# Patient Record
Sex: FEMALE | Race: WHITE | NOT HISPANIC OR LATINO
[De-identification: names, ages, dates, MRNs, and addresses within clinical notes are randomized per-mention and may not be internally consistent; named-entity substitution may affect disease eponyms.]

---

## 2017-01-12 PROBLEM — M16.11 PRIMARY LOCALIZED OSTEOARTHROSIS OF PELVIC REGION, RIGHT: Status: ACTIVE | Noted: 2017-01-12

## 2017-01-23 ENCOUNTER — FORM ENCOUNTER (OUTPATIENT)
Age: 72
End: 2017-01-23

## 2017-01-24 ENCOUNTER — OTHER (OUTPATIENT)
Age: 72
End: 2017-01-24

## 2017-01-24 ENCOUNTER — OUTPATIENT (OUTPATIENT)
Dept: OUTPATIENT SERVICES | Facility: HOSPITAL | Age: 72
LOS: 1 days | End: 2017-01-24
Payer: MEDICARE

## 2017-01-24 VITALS
TEMPERATURE: 99 F | DIASTOLIC BLOOD PRESSURE: 64 MMHG | SYSTOLIC BLOOD PRESSURE: 136 MMHG | WEIGHT: 194.01 LBS | RESPIRATION RATE: 16 BRPM | HEART RATE: 80 BPM | HEIGHT: 62 IN

## 2017-01-24 DIAGNOSIS — M16.11 UNILATERAL PRIMARY OSTEOARTHRITIS, RIGHT HIP: ICD-10-CM

## 2017-01-24 DIAGNOSIS — E11.9 TYPE 2 DIABETES MELLITUS WITHOUT COMPLICATIONS: ICD-10-CM

## 2017-01-24 DIAGNOSIS — Z98.890 OTHER SPECIFIED POSTPROCEDURAL STATES: Chronic | ICD-10-CM

## 2017-01-24 DIAGNOSIS — M19.90 UNSPECIFIED OSTEOARTHRITIS, UNSPECIFIED SITE: ICD-10-CM

## 2017-01-24 DIAGNOSIS — Z96.651 PRESENCE OF RIGHT ARTIFICIAL KNEE JOINT: Chronic | ICD-10-CM

## 2017-01-24 DIAGNOSIS — Z90.49 ACQUIRED ABSENCE OF OTHER SPECIFIED PARTS OF DIGESTIVE TRACT: Chronic | ICD-10-CM

## 2017-01-24 DIAGNOSIS — G47.30 SLEEP APNEA, UNSPECIFIED: ICD-10-CM

## 2017-01-24 LAB
APPEARANCE UR: CLEAR — SIGNIFICANT CHANGE UP
APTT BLD: 31.1 SEC — SIGNIFICANT CHANGE UP (ref 27.5–37.4)
BACTERIA # UR AUTO: SIGNIFICANT CHANGE UP
BASOPHILS # BLD AUTO: 0.03 K/UL — SIGNIFICANT CHANGE UP (ref 0–0.2)
BASOPHILS NFR BLD AUTO: 0.3 % — SIGNIFICANT CHANGE UP (ref 0–2)
BILIRUB UR-MCNC: NEGATIVE — SIGNIFICANT CHANGE UP
BLD GP AB SCN SERPL QL: NEGATIVE — SIGNIFICANT CHANGE UP
BLOOD UR QL VISUAL: NEGATIVE — SIGNIFICANT CHANGE UP
BUN SERPL-MCNC: 31 MG/DL — HIGH (ref 7–23)
CALCIUM SERPL-MCNC: 9.8 MG/DL — SIGNIFICANT CHANGE UP (ref 8.4–10.5)
CHLORIDE SERPL-SCNC: 103 MMOL/L — SIGNIFICANT CHANGE UP (ref 98–107)
CO2 SERPL-SCNC: 22 MMOL/L — SIGNIFICANT CHANGE UP (ref 22–31)
COLOR SPEC: SIGNIFICANT CHANGE UP
CREAT SERPL-MCNC: 0.73 MG/DL — SIGNIFICANT CHANGE UP (ref 0.5–1.3)
EOSINOPHIL # BLD AUTO: 0.32 K/UL — SIGNIFICANT CHANGE UP (ref 0–0.5)
EOSINOPHIL NFR BLD AUTO: 3.2 % — SIGNIFICANT CHANGE UP (ref 0–6)
GLUCOSE SERPL-MCNC: 133 MG/DL — HIGH (ref 70–99)
GLUCOSE UR-MCNC: NEGATIVE — SIGNIFICANT CHANGE UP
HBA1C BLD-MCNC: 6.7 % — HIGH (ref 4–5.6)
HCT VFR BLD CALC: 36.7 % — SIGNIFICANT CHANGE UP (ref 34.5–45)
HCT VFR BLD CALC: 36.7 % — SIGNIFICANT CHANGE UP (ref 34.5–45)
HGB BLD-MCNC: 12 G/DL — SIGNIFICANT CHANGE UP (ref 11.5–15.5)
HGB BLD-MCNC: 12 G/DL — SIGNIFICANT CHANGE UP (ref 11.5–15.5)
IMM GRANULOCYTES NFR BLD AUTO: 0.4 % — SIGNIFICANT CHANGE UP (ref 0–1.5)
INR BLD: 0.82 — LOW (ref 0.87–1.18)
KETONES UR-MCNC: NEGATIVE — SIGNIFICANT CHANGE UP
LEUKOCYTE ESTERASE UR-ACNC: HIGH
LYMPHOCYTES # BLD AUTO: 2.2 K/UL — SIGNIFICANT CHANGE UP (ref 1–3.3)
LYMPHOCYTES # BLD AUTO: 21.8 % — SIGNIFICANT CHANGE UP (ref 13–44)
MCHC RBC-ENTMCNC: 29.6 PG — SIGNIFICANT CHANGE UP (ref 27–34)
MCHC RBC-ENTMCNC: 29.6 PG — SIGNIFICANT CHANGE UP (ref 27–34)
MCHC RBC-ENTMCNC: 32.7 % — SIGNIFICANT CHANGE UP (ref 32–36)
MCHC RBC-ENTMCNC: 32.7 % — SIGNIFICANT CHANGE UP (ref 32–36)
MCV RBC AUTO: 90.4 FL — SIGNIFICANT CHANGE UP (ref 80–100)
MCV RBC AUTO: 90.4 FL — SIGNIFICANT CHANGE UP (ref 80–100)
MONOCYTES # BLD AUTO: 0.83 K/UL — SIGNIFICANT CHANGE UP (ref 0–0.9)
MONOCYTES NFR BLD AUTO: 8.2 % — SIGNIFICANT CHANGE UP (ref 2–14)
MUCOUS THREADS # UR AUTO: SIGNIFICANT CHANGE UP
NEUTROPHILS # BLD AUTO: 6.68 K/UL — SIGNIFICANT CHANGE UP (ref 1.8–7.4)
NEUTROPHILS NFR BLD AUTO: 66.1 % — SIGNIFICANT CHANGE UP (ref 43–77)
NITRITE UR-MCNC: NEGATIVE — SIGNIFICANT CHANGE UP
NON-SQ EPI CELLS # UR AUTO: <1 — SIGNIFICANT CHANGE UP
PH UR: 5.5 — SIGNIFICANT CHANGE UP (ref 4.6–8)
PLATELET # BLD AUTO: 253 K/UL — SIGNIFICANT CHANGE UP (ref 150–400)
PLATELET # BLD AUTO: 253 K/UL — SIGNIFICANT CHANGE UP (ref 150–400)
PMV BLD: 9.6 FL — SIGNIFICANT CHANGE UP (ref 7–13)
PMV BLD: 9.6 FL — SIGNIFICANT CHANGE UP (ref 7–13)
POTASSIUM SERPL-MCNC: 5 MMOL/L — SIGNIFICANT CHANGE UP (ref 3.5–5.3)
POTASSIUM SERPL-SCNC: 5 MMOL/L — SIGNIFICANT CHANGE UP (ref 3.5–5.3)
PROT UR-MCNC: 100 — HIGH
PROTHROM AB SERPL-ACNC: 9.3 SEC — LOW (ref 10–13.1)
RBC # BLD: 4.06 M/UL — SIGNIFICANT CHANGE UP (ref 3.8–5.2)
RBC # BLD: 4.06 M/UL — SIGNIFICANT CHANGE UP (ref 3.8–5.2)
RBC # FLD: 15 % — HIGH (ref 10.3–14.5)
RBC # FLD: 15 % — HIGH (ref 10.3–14.5)
RBC CASTS # UR COMP ASSIST: SIGNIFICANT CHANGE UP (ref 0–?)
RH IG SCN BLD-IMP: POSITIVE — SIGNIFICANT CHANGE UP
SODIUM SERPL-SCNC: 142 MMOL/L — SIGNIFICANT CHANGE UP (ref 135–145)
SP GR SPEC: 1.02 — SIGNIFICANT CHANGE UP (ref 1–1.03)
SQUAMOUS # UR AUTO: SIGNIFICANT CHANGE UP
UROBILINOGEN FLD QL: NORMAL E.U. — SIGNIFICANT CHANGE UP (ref 0.1–0.2)
WBC # BLD: 10.1 K/UL — SIGNIFICANT CHANGE UP (ref 3.8–10.5)
WBC # BLD: 10.1 K/UL — SIGNIFICANT CHANGE UP (ref 3.8–10.5)
WBC # FLD AUTO: 10.1 K/UL — SIGNIFICANT CHANGE UP (ref 3.8–10.5)
WBC # FLD AUTO: 10.1 K/UL — SIGNIFICANT CHANGE UP (ref 3.8–10.5)
WBC UR QL: HIGH (ref 0–?)

## 2017-01-24 PROCEDURE — 93010 ELECTROCARDIOGRAM REPORT: CPT

## 2017-01-24 PROCEDURE — 71020: CPT | Mod: 26

## 2017-01-24 RX ORDER — SODIUM CHLORIDE 9 MG/ML
3 INJECTION INTRAMUSCULAR; INTRAVENOUS; SUBCUTANEOUS EVERY 8 HOURS
Qty: 0 | Refills: 0 | Status: DISCONTINUED | OUTPATIENT
Start: 2017-03-06 | End: 2017-03-08

## 2017-01-24 RX ORDER — SODIUM CHLORIDE 9 MG/ML
1000 INJECTION, SOLUTION INTRAVENOUS
Qty: 0 | Refills: 0 | Status: DISCONTINUED | OUTPATIENT
Start: 2017-03-06 | End: 2017-03-06

## 2017-01-24 RX ORDER — MUPIROCIN CALCIUM 20 MG/G
2 OINTMENT TOPICAL
Qty: 32 | Refills: 0 | Status: ACTIVE | COMMUNITY
Start: 2017-01-24 | End: 1900-01-01

## 2017-01-24 RX ORDER — PANTOPRAZOLE SODIUM 20 MG/1
40 TABLET, DELAYED RELEASE ORAL ONCE
Qty: 0 | Refills: 0 | Status: DISCONTINUED | OUTPATIENT
Start: 2017-03-06 | End: 2017-03-06

## 2017-01-24 RX ORDER — ACETAMINOPHEN 500 MG
975 TABLET ORAL ONCE
Qty: 0 | Refills: 0 | Status: DISCONTINUED | OUTPATIENT
Start: 2017-03-06 | End: 2017-03-06

## 2017-01-24 RX ORDER — TRAMADOL HYDROCHLORIDE 50 MG/1
50 TABLET ORAL ONCE
Qty: 0 | Refills: 0 | Status: DISCONTINUED | OUTPATIENT
Start: 2017-03-06 | End: 2017-03-06

## 2017-01-24 NOTE — H&P PST ADULT - PMH
Colon polyp  benign 2003  Depression    Diabetes mellitus  type 2  Elevated uric acid in blood    HTN (hypertension)    Hyperlipidemia    Hypothyroidism    Knee pain  right knee pain and deformity  since age 21  Obesity (BMI 30-39.9)    Shoulder fracture, right  2006  Sleep apnea  uses CPAP machine ; had sleep studies in 2003 and 2011 Colon polyp  benign 2003  Depression    Diabetes mellitus  type 2  Elevated uric acid in blood    HTN (hypertension)    Hyperlipidemia    Hypothyroidism    Knee pain  right knee pain and deformity  since age 21  Obesity (BMI 30-39.9)    Osteoarthritis    Shoulder fracture, right  2006  Sleep apnea  uses CPAP machine ; had sleep studies in 2003 and 2011

## 2017-01-24 NOTE — H&P PST ADULT - PROBLEM SELECTOR PLAN 1
scheduled for  right total hip replacement direct anterior approach on 2/6/17.   labs pending   EKG in chart.   preop instructions provided to pt

## 2017-01-24 NOTE — H&P PST ADULT - PROBLEM SELECTOR PLAN 2
a1c done and will order FS for the day of surgery.  pt advised to stop metformin after the am dose on 2/5/17.

## 2017-01-24 NOTE — H&P PST ADULT - NEGATIVE ENMT SYMPTOMS
no hearing difficulty/no gum bleeding/no throat pain/no nose bleeds/no recurrent cold sores/no post-nasal discharge/no dysphagia/no abnormal taste sensation/no dry mouth/no sinus symptoms/no ear pain/no nasal obstruction/no nasal discharge/no nasal congestion

## 2017-01-24 NOTE — H&P PST ADULT - PSH
H/O knee surgery  right knee x 3 prior to  right knee replacement - 1966 - 1995  H/O shoulder surgery  right - 2006  H/O total knee replacement, right  2008  History of colon resection  for benign polyp - 2003

## 2017-01-24 NOTE — H&P PST ADULT - HISTORY OF PRESENT ILLNESS
71 yrs old female with h/o Osteoarthritis and with c/o right hip pain for many yrs presents for preop eval to have 71 yrs old female with h/o Osteoarthritis and with c/o right hip pain for many yrs presents for preop eval to have right total hip replacement direct anterior approach on 2/6/17. pt stated that she has been having pain for many yrs , uses cane to walk and and X rays and MRI and replacement was recommended.

## 2017-01-24 NOTE — H&P PST ADULT - NEGATIVE GASTROINTESTINAL SYMPTOMS
no melena/no abdominal pain/no constipation/no hematochezia/no flatulence/no change in bowel habits/no nausea

## 2017-01-24 NOTE — H&P PST ADULT - ASSESSMENT
71 yrs old female with h/o Osteoarthritis and with c/o right hip pain for many yrs presents for preop eval to have right total hip replacement direct anterior approach on 2/6/17.

## 2017-01-25 LAB
GRAM STN SPT: SIGNIFICANT CHANGE UP
SPECIMEN SOURCE: SIGNIFICANT CHANGE UP
SPECIMEN SOURCE: SIGNIFICANT CHANGE UP

## 2017-01-26 ENCOUNTER — RX RENEWAL (OUTPATIENT)
Age: 72
End: 2017-01-26

## 2017-01-26 ENCOUNTER — OTHER (OUTPATIENT)
Age: 72
End: 2017-01-26

## 2017-01-26 LAB
-  AMIKACIN: SIGNIFICANT CHANGE UP
-  AMPICILLIN/SULBACTAM: SIGNIFICANT CHANGE UP
-  AMPICILLIN: SIGNIFICANT CHANGE UP
-  AZTREONAM: SIGNIFICANT CHANGE UP
-  CEFAZOLIN: SIGNIFICANT CHANGE UP
-  CEFEPIME: SIGNIFICANT CHANGE UP
-  CEFOXITIN: SIGNIFICANT CHANGE UP
-  CEFTAZIDIME: SIGNIFICANT CHANGE UP
-  CEFTRIAXONE: SIGNIFICANT CHANGE UP
-  CIPROFLOXACIN: SIGNIFICANT CHANGE UP
-  ERTAPENEM: SIGNIFICANT CHANGE UP
-  GENTAMICIN: SIGNIFICANT CHANGE UP
-  IMIPENEM: SIGNIFICANT CHANGE UP
-  LEVOFLOXACIN: SIGNIFICANT CHANGE UP
-  MEROPENEM: SIGNIFICANT CHANGE UP
-  NITROFURANTOIN: SIGNIFICANT CHANGE UP
-  PIPERACILLIN/TAZOBACTAM: SIGNIFICANT CHANGE UP
-  TIGECYCLINE: SIGNIFICANT CHANGE UP
-  TOBRAMYCIN: SIGNIFICANT CHANGE UP
-  TRIMETHOPRIM/SULFAMETHOXAZOLE: SIGNIFICANT CHANGE UP
BACTERIA SPT RESP CULT: SIGNIFICANT CHANGE UP
BACTERIA UR CULT: SIGNIFICANT CHANGE UP
METHOD TYPE: SIGNIFICANT CHANGE UP
ORGANISM # SPEC MICROSCOPIC CNT: SIGNIFICANT CHANGE UP
ORGANISM # SPEC MICROSCOPIC CNT: SIGNIFICANT CHANGE UP

## 2017-01-26 RX ORDER — MUPIROCIN 20 MG/G
2 OINTMENT TOPICAL
Qty: 22 | Refills: 0 | Status: ACTIVE | COMMUNITY
Start: 2017-01-26 | End: 1900-01-01

## 2017-01-26 RX ORDER — MUPIROCIN 20 MG/G
2 OINTMENT TOPICAL 3 TIMES DAILY
Qty: 32 | Refills: 0 | Status: ACTIVE | COMMUNITY
Start: 2017-01-26 | End: 1900-01-01

## 2017-01-27 ENCOUNTER — OTHER (OUTPATIENT)
Age: 72
End: 2017-01-27

## 2017-01-27 RX ORDER — CIPROFLOXACIN HYDROCHLORIDE 500 MG/1
500 TABLET, FILM COATED ORAL TWICE DAILY
Qty: 10 | Refills: 0 | Status: ACTIVE | COMMUNITY
Start: 2017-01-27 | End: 1900-01-01

## 2017-02-06 ENCOUNTER — APPOINTMENT (OUTPATIENT)
Dept: ORTHOPEDIC SURGERY | Facility: HOSPITAL | Age: 72
End: 2017-02-06

## 2017-02-14 PROBLEM — E66.9 OBESITY, UNSPECIFIED: Chronic | Status: ACTIVE | Noted: 2017-01-24

## 2017-02-14 PROBLEM — E11.9 TYPE 2 DIABETES MELLITUS WITHOUT COMPLICATIONS: Chronic | Status: ACTIVE | Noted: 2017-01-24

## 2017-02-14 PROBLEM — S42.91XA FRACTURE OF RIGHT SHOULDER GIRDLE, PART UNSPECIFIED, INITIAL ENCOUNTER FOR CLOSED FRACTURE: Chronic | Status: ACTIVE | Noted: 2017-01-24

## 2017-02-14 PROBLEM — E78.5 HYPERLIPIDEMIA, UNSPECIFIED: Chronic | Status: ACTIVE | Noted: 2017-01-24

## 2017-02-14 PROBLEM — M19.90 UNSPECIFIED OSTEOARTHRITIS, UNSPECIFIED SITE: Chronic | Status: ACTIVE | Noted: 2017-01-24

## 2017-02-14 PROBLEM — E79.0 HYPERURICEMIA WITHOUT SIGNS OF INFLAMMATORY ARTHRITIS AND TOPHACEOUS DISEASE: Chronic | Status: ACTIVE | Noted: 2017-01-24

## 2017-02-14 PROBLEM — E03.9 HYPOTHYROIDISM, UNSPECIFIED: Chronic | Status: ACTIVE | Noted: 2017-01-24

## 2017-02-14 PROBLEM — M25.569 PAIN IN UNSPECIFIED KNEE: Chronic | Status: ACTIVE | Noted: 2017-01-24

## 2017-02-14 PROBLEM — G47.30 SLEEP APNEA, UNSPECIFIED: Chronic | Status: ACTIVE | Noted: 2017-01-24

## 2017-02-14 PROBLEM — K63.5 POLYP OF COLON: Chronic | Status: ACTIVE | Noted: 2017-01-24

## 2017-02-14 PROBLEM — F32.9 MAJOR DEPRESSIVE DISORDER, SINGLE EPISODE, UNSPECIFIED: Chronic | Status: ACTIVE | Noted: 2017-01-24

## 2017-02-14 PROBLEM — I10 ESSENTIAL (PRIMARY) HYPERTENSION: Chronic | Status: ACTIVE | Noted: 2017-01-24

## 2017-02-21 ENCOUNTER — OUTPATIENT (OUTPATIENT)
Dept: OUTPATIENT SERVICES | Facility: HOSPITAL | Age: 72
LOS: 1 days | End: 2017-02-21
Payer: MEDICARE

## 2017-02-21 VITALS
WEIGHT: 190.04 LBS | DIASTOLIC BLOOD PRESSURE: 66 MMHG | HEART RATE: 85 BPM | TEMPERATURE: 98 F | RESPIRATION RATE: 16 BRPM | SYSTOLIC BLOOD PRESSURE: 120 MMHG | HEIGHT: 62 IN

## 2017-02-21 DIAGNOSIS — Z98.890 OTHER SPECIFIED POSTPROCEDURAL STATES: Chronic | ICD-10-CM

## 2017-02-21 DIAGNOSIS — Z96.651 PRESENCE OF RIGHT ARTIFICIAL KNEE JOINT: Chronic | ICD-10-CM

## 2017-02-21 DIAGNOSIS — Z90.49 ACQUIRED ABSENCE OF OTHER SPECIFIED PARTS OF DIGESTIVE TRACT: Chronic | ICD-10-CM

## 2017-02-21 DIAGNOSIS — M16.11 UNILATERAL PRIMARY OSTEOARTHRITIS, RIGHT HIP: ICD-10-CM

## 2017-02-21 LAB
ALBUMIN SERPL ELPH-MCNC: 4.3 G/DL — SIGNIFICANT CHANGE UP (ref 3.3–5)
ALP SERPL-CCNC: 117 U/L — SIGNIFICANT CHANGE UP (ref 40–120)
ALT FLD-CCNC: 13 U/L — SIGNIFICANT CHANGE UP (ref 4–33)
APPEARANCE UR: CLEAR — SIGNIFICANT CHANGE UP
APTT BLD: 31.4 SEC — SIGNIFICANT CHANGE UP (ref 27.5–37.4)
AST SERPL-CCNC: 17 U/L — SIGNIFICANT CHANGE UP (ref 4–32)
BASOPHILS # BLD AUTO: 0.02 K/UL — SIGNIFICANT CHANGE UP (ref 0–0.2)
BASOPHILS NFR BLD AUTO: 0.2 % — SIGNIFICANT CHANGE UP (ref 0–2)
BILIRUB SERPL-MCNC: 0.2 MG/DL — SIGNIFICANT CHANGE UP (ref 0.2–1.2)
BILIRUB UR-MCNC: NEGATIVE — SIGNIFICANT CHANGE UP
BLD GP AB SCN SERPL QL: NEGATIVE — SIGNIFICANT CHANGE UP
BLOOD UR QL VISUAL: NEGATIVE — SIGNIFICANT CHANGE UP
BUN SERPL-MCNC: 28 MG/DL — HIGH (ref 7–23)
BUN SERPL-MCNC: 28 MG/DL — HIGH (ref 7–23)
CALCIUM SERPL-MCNC: 10.1 MG/DL — SIGNIFICANT CHANGE UP (ref 8.4–10.5)
CALCIUM SERPL-MCNC: 10.1 MG/DL — SIGNIFICANT CHANGE UP (ref 8.4–10.5)
CHLORIDE SERPL-SCNC: 102 MMOL/L — SIGNIFICANT CHANGE UP (ref 98–107)
CHLORIDE SERPL-SCNC: 102 MMOL/L — SIGNIFICANT CHANGE UP (ref 98–107)
CO2 SERPL-SCNC: 23 MMOL/L — SIGNIFICANT CHANGE UP (ref 22–31)
CO2 SERPL-SCNC: 23 MMOL/L — SIGNIFICANT CHANGE UP (ref 22–31)
COLOR SPEC: SIGNIFICANT CHANGE UP
CREAT SERPL-MCNC: 0.85 MG/DL — SIGNIFICANT CHANGE UP (ref 0.5–1.3)
CREAT SERPL-MCNC: 0.85 MG/DL — SIGNIFICANT CHANGE UP (ref 0.5–1.3)
EOSINOPHIL # BLD AUTO: 0.26 K/UL — SIGNIFICANT CHANGE UP (ref 0–0.5)
EOSINOPHIL NFR BLD AUTO: 2.8 % — SIGNIFICANT CHANGE UP (ref 0–6)
GLUCOSE SERPL-MCNC: 186 MG/DL — HIGH (ref 70–99)
GLUCOSE SERPL-MCNC: 186 MG/DL — HIGH (ref 70–99)
GLUCOSE UR-MCNC: NEGATIVE — SIGNIFICANT CHANGE UP
HBA1C BLD-MCNC: 6.5 % — HIGH (ref 4–5.6)
HCT VFR BLD CALC: 35 % — SIGNIFICANT CHANGE UP (ref 34.5–45)
HGB BLD-MCNC: 11.1 G/DL — LOW (ref 11.5–15.5)
IMM GRANULOCYTES NFR BLD AUTO: 0.4 % — SIGNIFICANT CHANGE UP (ref 0–1.5)
INR BLD: 0.89 — SIGNIFICANT CHANGE UP (ref 0.87–1.18)
KETONES UR-MCNC: NEGATIVE — SIGNIFICANT CHANGE UP
LEUKOCYTE ESTERASE UR-ACNC: NEGATIVE — SIGNIFICANT CHANGE UP
LYMPHOCYTES # BLD AUTO: 2.11 K/UL — SIGNIFICANT CHANGE UP (ref 1–3.3)
LYMPHOCYTES # BLD AUTO: 22.5 % — SIGNIFICANT CHANGE UP (ref 13–44)
MCHC RBC-ENTMCNC: 28.6 PG — SIGNIFICANT CHANGE UP (ref 27–34)
MCHC RBC-ENTMCNC: 31.7 % — LOW (ref 32–36)
MCV RBC AUTO: 90.2 FL — SIGNIFICANT CHANGE UP (ref 80–100)
MONOCYTES # BLD AUTO: 0.7 K/UL — SIGNIFICANT CHANGE UP (ref 0–0.9)
MONOCYTES NFR BLD AUTO: 7.5 % — SIGNIFICANT CHANGE UP (ref 2–14)
MUCOUS THREADS # UR AUTO: SIGNIFICANT CHANGE UP
NEUTROPHILS # BLD AUTO: 6.24 K/UL — SIGNIFICANT CHANGE UP (ref 1.8–7.4)
NEUTROPHILS NFR BLD AUTO: 66.6 % — SIGNIFICANT CHANGE UP (ref 43–77)
NITRITE UR-MCNC: NEGATIVE — SIGNIFICANT CHANGE UP
PH UR: 6 — SIGNIFICANT CHANGE UP (ref 4.6–8)
PLATELET # BLD AUTO: 269 K/UL — SIGNIFICANT CHANGE UP (ref 150–400)
PMV BLD: 9.4 FL — SIGNIFICANT CHANGE UP (ref 7–13)
POTASSIUM SERPL-MCNC: 4.1 MMOL/L — SIGNIFICANT CHANGE UP (ref 3.5–5.3)
POTASSIUM SERPL-MCNC: 4.1 MMOL/L — SIGNIFICANT CHANGE UP (ref 3.5–5.3)
POTASSIUM SERPL-SCNC: 4.1 MMOL/L — SIGNIFICANT CHANGE UP (ref 3.5–5.3)
POTASSIUM SERPL-SCNC: 4.1 MMOL/L — SIGNIFICANT CHANGE UP (ref 3.5–5.3)
PROT SERPL-MCNC: 7.2 G/DL — SIGNIFICANT CHANGE UP (ref 6–8.3)
PROT UR-MCNC: 100 — HIGH
PROTHROM AB SERPL-ACNC: 10.1 SEC — SIGNIFICANT CHANGE UP (ref 10–13.1)
RBC # BLD: 3.88 M/UL — SIGNIFICANT CHANGE UP (ref 3.8–5.2)
RBC # FLD: 15 % — HIGH (ref 10.3–14.5)
RBC CASTS # UR COMP ASSIST: SIGNIFICANT CHANGE UP (ref 0–?)
RH IG SCN BLD-IMP: POSITIVE — SIGNIFICANT CHANGE UP
SODIUM SERPL-SCNC: 145 MMOL/L — SIGNIFICANT CHANGE UP (ref 135–145)
SODIUM SERPL-SCNC: 145 MMOL/L — SIGNIFICANT CHANGE UP (ref 135–145)
SP GR SPEC: 1.02 — SIGNIFICANT CHANGE UP (ref 1–1.03)
SQUAMOUS # UR AUTO: SIGNIFICANT CHANGE UP
TSH SERPL-MCNC: 2.75 UIU/ML — SIGNIFICANT CHANGE UP (ref 0.27–4.2)
UROBILINOGEN FLD QL: NORMAL E.U. — SIGNIFICANT CHANGE UP (ref 0.1–0.2)
WBC # BLD: 9.37 K/UL — SIGNIFICANT CHANGE UP (ref 3.8–10.5)
WBC # FLD AUTO: 9.37 K/UL — SIGNIFICANT CHANGE UP (ref 3.8–10.5)
WBC UR QL: SIGNIFICANT CHANGE UP (ref 0–?)

## 2017-02-21 PROCEDURE — 93010 ELECTROCARDIOGRAM REPORT: CPT

## 2017-02-21 RX ORDER — PANTOPRAZOLE SODIUM 20 MG/1
40 TABLET, DELAYED RELEASE ORAL ONCE
Qty: 0 | Refills: 0 | Status: COMPLETED | OUTPATIENT
Start: 2017-03-06 | End: 2017-03-06

## 2017-02-21 RX ORDER — ACETAMINOPHEN 500 MG
975 TABLET ORAL ONCE
Qty: 0 | Refills: 0 | Status: COMPLETED | OUTPATIENT
Start: 2017-03-06 | End: 2017-03-06

## 2017-02-21 RX ORDER — GABAPENTIN 400 MG/1
100 CAPSULE ORAL ONCE
Qty: 0 | Refills: 0 | Status: COMPLETED | OUTPATIENT
Start: 2017-03-06 | End: 2017-03-06

## 2017-02-21 RX ORDER — SODIUM CHLORIDE 9 MG/ML
1000 INJECTION, SOLUTION INTRAVENOUS
Qty: 0 | Refills: 0 | Status: DISCONTINUED | OUTPATIENT
Start: 2017-03-06 | End: 2017-03-06

## 2017-02-21 RX ORDER — TRAMADOL HYDROCHLORIDE 50 MG/1
50 TABLET ORAL ONCE
Qty: 0 | Refills: 0 | Status: DISCONTINUED | OUTPATIENT
Start: 2017-03-06 | End: 2017-03-06

## 2017-02-21 RX ORDER — OMEGA-3 ACID ETHYL ESTERS 1 G
1 CAPSULE ORAL
Qty: 0 | Refills: 0 | COMMUNITY

## 2017-02-21 NOTE — H&P PST ADULT - HISTORY OF PRESENT ILLNESS
71 yrs old female with h/o Osteoarthritis and with c/o right hip pain for many yrs presents for preop eval to have right total hip replacement direct anterior approach on 3/6/17. Surgery was previously scheduled , then cancelled as pt had a cold which has not resolved. Surgery now rescheduled.

## 2017-02-21 NOTE — H&P PST ADULT - PROBLEM SELECTOR PLAN 2
Pt advised not to take Metformin night prior to surgery, or morning of surgery.  Accucheck on admission

## 2017-02-21 NOTE — H&P PST ADULT - PROBLEM SELECTOR PLAN 1
right total hip replacement direct anterior approach on 3/6/17.    CBC CMP PT/PTT T&S nsoe culture HgbA1C UA CS TSH    Pt reports she was seen by Dr. Britt for clearance, requested on chart.  EKG done at [facility] sent to Dr. Britt    Pt reports she has appt to see PMD 2/27/17 for clearance

## 2017-02-21 NOTE — H&P PST ADULT - ENDOCRINE COMMENTS
h/o DM type 2  Pt does not often check FSBS.  Hypothyroidism, levothyroxine recently increased from 150 mcg to 175 mcg

## 2017-02-21 NOTE — H&P PST ADULT - NEUROLOGICAL DETAILS
alert and oriented x 3/responds to verbal commands/responds to pain alert and oriented x 3/cranial nerves intact/sensation intact/responds to pain/responds to verbal commands

## 2017-02-21 NOTE — H&P PST ADULT - NEGATIVE GASTROINTESTINAL SYMPTOMS
no nausea/no hematochezia/no abdominal pain/no diarrhea/no melena/no constipation/no change in bowel habits/no flatulence/no vomiting

## 2017-02-21 NOTE — H&P PST ADULT - PMH
Colon polyp  benign 2003  Depression    Diabetes mellitus  type 2  Elevated uric acid in blood    HTN (hypertension)    Hyperlipidemia    Hypothyroidism    Knee pain  right knee pain and deformity  since age 21  Obesity (BMI 30-39.9)    Osteoarthritis    Shoulder fracture, right  2006  Sleep apnea  uses CPAP machine ; had sleep studies in 2003 and 2011

## 2017-02-21 NOTE — H&P PST ADULT - BLOOD TRANSFUSION, PREVIOUS, PROFILE
Patient came in for her weekly allergy injection. Injection given as charted.  Patient tolerated injection well with no reaction noted or verbalized.  
no

## 2017-02-22 LAB
SPECIMEN SOURCE: SIGNIFICANT CHANGE UP
SPECIMEN SOURCE: SIGNIFICANT CHANGE UP

## 2017-02-23 ENCOUNTER — APPOINTMENT (OUTPATIENT)
Dept: ORTHOPEDIC SURGERY | Facility: CLINIC | Age: 72
End: 2017-02-23

## 2017-02-23 LAB
BACTERIA NPH CULT: SIGNIFICANT CHANGE UP
BACTERIA UR CULT: SIGNIFICANT CHANGE UP

## 2017-03-05 ENCOUNTER — RESULT REVIEW (OUTPATIENT)
Age: 72
End: 2017-03-05

## 2017-03-06 ENCOUNTER — APPOINTMENT (OUTPATIENT)
Dept: ORTHOPEDIC SURGERY | Facility: HOSPITAL | Age: 72
End: 2017-03-06

## 2017-03-06 ENCOUNTER — INPATIENT (INPATIENT)
Facility: HOSPITAL | Age: 72
LOS: 1 days | Discharge: HOME | End: 2017-03-08
Attending: ORTHOPAEDIC SURGERY | Admitting: ORTHOPAEDIC SURGERY
Payer: MEDICARE

## 2017-03-06 VITALS
DIASTOLIC BLOOD PRESSURE: 56 MMHG | WEIGHT: 190.04 LBS | HEART RATE: 81 BPM | SYSTOLIC BLOOD PRESSURE: 135 MMHG | TEMPERATURE: 98 F | OXYGEN SATURATION: 95 % | RESPIRATION RATE: 18 BRPM | HEIGHT: 62 IN

## 2017-03-06 DIAGNOSIS — Z98.890 OTHER SPECIFIED POSTPROCEDURAL STATES: Chronic | ICD-10-CM

## 2017-03-06 DIAGNOSIS — Z90.49 ACQUIRED ABSENCE OF OTHER SPECIFIED PARTS OF DIGESTIVE TRACT: Chronic | ICD-10-CM

## 2017-03-06 DIAGNOSIS — M16.11 UNILATERAL PRIMARY OSTEOARTHRITIS, RIGHT HIP: ICD-10-CM

## 2017-03-06 DIAGNOSIS — Z96.651 PRESENCE OF RIGHT ARTIFICIAL KNEE JOINT: Chronic | ICD-10-CM

## 2017-03-06 LAB
BUN SERPL-MCNC: 37 MG/DL — HIGH (ref 7–23)
CALCIUM SERPL-MCNC: 8.8 MG/DL — SIGNIFICANT CHANGE UP (ref 8.4–10.5)
CHLORIDE SERPL-SCNC: 104 MMOL/L — SIGNIFICANT CHANGE UP (ref 98–107)
CO2 SERPL-SCNC: 21 MMOL/L — LOW (ref 22–31)
CREAT SERPL-MCNC: 0.83 MG/DL — SIGNIFICANT CHANGE UP (ref 0.5–1.3)
GLUCOSE SERPL-MCNC: 227 MG/DL — HIGH (ref 70–99)
HCT VFR BLD CALC: 29.3 % — LOW (ref 34.5–45)
HGB BLD-MCNC: 9.3 G/DL — LOW (ref 11.5–15.5)
MCHC RBC-ENTMCNC: 28.4 PG — SIGNIFICANT CHANGE UP (ref 27–34)
MCHC RBC-ENTMCNC: 31.7 % — LOW (ref 32–36)
MCV RBC AUTO: 89.3 FL — SIGNIFICANT CHANGE UP (ref 80–100)
PLATELET # BLD AUTO: 205 K/UL — SIGNIFICANT CHANGE UP (ref 150–400)
PMV BLD: 9.1 FL — SIGNIFICANT CHANGE UP (ref 7–13)
POTASSIUM SERPL-MCNC: 4.7 MMOL/L — SIGNIFICANT CHANGE UP (ref 3.5–5.3)
POTASSIUM SERPL-SCNC: 4.7 MMOL/L — SIGNIFICANT CHANGE UP (ref 3.5–5.3)
RBC # BLD: 3.28 M/UL — LOW (ref 3.8–5.2)
RBC # FLD: 15.2 % — HIGH (ref 10.3–14.5)
SODIUM SERPL-SCNC: 141 MMOL/L — SIGNIFICANT CHANGE UP (ref 135–145)
WBC # BLD: 11.71 K/UL — HIGH (ref 3.8–10.5)
WBC # FLD AUTO: 11.71 K/UL — HIGH (ref 3.8–10.5)

## 2017-03-06 PROCEDURE — 73501 X-RAY EXAM HIP UNI 1 VIEW: CPT | Mod: 26,LT

## 2017-03-06 PROCEDURE — 27130 TOTAL HIP ARTHROPLASTY: CPT | Mod: RT

## 2017-03-06 PROCEDURE — 88311 DECALCIFY TISSUE: CPT | Mod: 26

## 2017-03-06 PROCEDURE — 88305 TISSUE EXAM BY PATHOLOGIST: CPT | Mod: 26

## 2017-03-06 RX ORDER — DEXTROSE 50 % IN WATER 50 %
1 SYRINGE (ML) INTRAVENOUS ONCE
Qty: 0 | Refills: 0 | Status: DISCONTINUED | OUTPATIENT
Start: 2017-03-06 | End: 2017-03-08

## 2017-03-06 RX ORDER — SODIUM CHLORIDE 9 MG/ML
1000 INJECTION, SOLUTION INTRAVENOUS
Qty: 0 | Refills: 0 | Status: DISCONTINUED | OUTPATIENT
Start: 2017-03-06 | End: 2017-03-08

## 2017-03-06 RX ORDER — ASPIRIN/CALCIUM CARB/MAGNESIUM 324 MG
325 TABLET ORAL
Qty: 0 | Refills: 0 | Status: DISCONTINUED | OUTPATIENT
Start: 2017-03-06 | End: 2017-03-08

## 2017-03-06 RX ORDER — OXYCODONE HYDROCHLORIDE 5 MG/1
10 TABLET ORAL EVERY 4 HOURS
Qty: 0 | Refills: 0 | Status: DISCONTINUED | OUTPATIENT
Start: 2017-03-06 | End: 2017-03-07

## 2017-03-06 RX ORDER — ONDANSETRON 8 MG/1
4 TABLET, FILM COATED ORAL EVERY 6 HOURS
Qty: 0 | Refills: 0 | Status: DISCONTINUED | OUTPATIENT
Start: 2017-03-06 | End: 2017-03-06

## 2017-03-06 RX ORDER — TRAMADOL HYDROCHLORIDE 50 MG/1
50 TABLET ORAL EVERY 8 HOURS
Qty: 0 | Refills: 0 | Status: DISCONTINUED | OUTPATIENT
Start: 2017-03-06 | End: 2017-03-08

## 2017-03-06 RX ORDER — ATORVASTATIN CALCIUM 80 MG/1
1 TABLET, FILM COATED ORAL
Qty: 0 | Refills: 0 | COMMUNITY

## 2017-03-06 RX ORDER — LEVOTHYROXINE SODIUM 125 MCG
175 TABLET ORAL DAILY
Qty: 0 | Refills: 0 | Status: DISCONTINUED | OUTPATIENT
Start: 2017-03-06 | End: 2017-03-08

## 2017-03-06 RX ORDER — MORPHINE SULFATE 50 MG/1
4 CAPSULE, EXTENDED RELEASE ORAL EVERY 4 HOURS
Qty: 0 | Refills: 0 | Status: DISCONTINUED | OUTPATIENT
Start: 2017-03-06 | End: 2017-03-08

## 2017-03-06 RX ORDER — CELECOXIB 200 MG/1
200 CAPSULE ORAL
Qty: 0 | Refills: 0 | Status: DISCONTINUED | OUTPATIENT
Start: 2017-03-08 | End: 2017-03-08

## 2017-03-06 RX ORDER — VERAPAMIL HCL 240 MG
1 CAPSULE, EXTENDED RELEASE PELLETS 24 HR ORAL
Qty: 0 | Refills: 0 | COMMUNITY

## 2017-03-06 RX ORDER — ALLOPURINOL 300 MG
1 TABLET ORAL
Qty: 0 | Refills: 0 | COMMUNITY

## 2017-03-06 RX ORDER — SENNA PLUS 8.6 MG/1
2 TABLET ORAL AT BEDTIME
Qty: 0 | Refills: 0 | Status: DISCONTINUED | OUTPATIENT
Start: 2017-03-06 | End: 2017-03-08

## 2017-03-06 RX ORDER — HYDROMORPHONE HYDROCHLORIDE 2 MG/ML
0.5 INJECTION INTRAMUSCULAR; INTRAVENOUS; SUBCUTANEOUS
Qty: 0 | Refills: 0 | Status: DISCONTINUED | OUTPATIENT
Start: 2017-03-06 | End: 2017-03-06

## 2017-03-06 RX ORDER — LISINOPRIL 2.5 MG/1
20 TABLET ORAL DAILY
Qty: 0 | Refills: 0 | Status: DISCONTINUED | OUTPATIENT
Start: 2017-03-06 | End: 2017-03-08

## 2017-03-06 RX ORDER — SERTRALINE 25 MG/1
100 TABLET, FILM COATED ORAL DAILY
Qty: 0 | Refills: 0 | Status: DISCONTINUED | OUTPATIENT
Start: 2017-03-06 | End: 2017-03-08

## 2017-03-06 RX ORDER — PANTOPRAZOLE SODIUM 20 MG/1
40 TABLET, DELAYED RELEASE ORAL DAILY
Qty: 0 | Refills: 0 | Status: DISCONTINUED | OUTPATIENT
Start: 2017-03-06 | End: 2017-03-08

## 2017-03-06 RX ORDER — KETOROLAC TROMETHAMINE 30 MG/ML
15 SYRINGE (ML) INJECTION EVERY 8 HOURS
Qty: 0 | Refills: 0 | Status: DISCONTINUED | OUTPATIENT
Start: 2017-03-06 | End: 2017-03-07

## 2017-03-06 RX ORDER — ACETAMINOPHEN 500 MG
1000 TABLET ORAL ONCE
Qty: 0 | Refills: 0 | Status: DISCONTINUED | OUTPATIENT
Start: 2017-03-06 | End: 2017-03-06

## 2017-03-06 RX ORDER — INSULIN LISPRO 100/ML
VIAL (ML) SUBCUTANEOUS
Qty: 0 | Refills: 0 | Status: DISCONTINUED | OUTPATIENT
Start: 2017-03-06 | End: 2017-03-08

## 2017-03-06 RX ORDER — DEXTROSE 50 % IN WATER 50 %
12.5 SYRINGE (ML) INTRAVENOUS ONCE
Qty: 0 | Refills: 0 | Status: DISCONTINUED | OUTPATIENT
Start: 2017-03-06 | End: 2017-03-08

## 2017-03-06 RX ORDER — METFORMIN HYDROCHLORIDE 850 MG/1
0 TABLET ORAL
Qty: 0 | Refills: 0 | COMMUNITY

## 2017-03-06 RX ORDER — SODIUM CHLORIDE 9 MG/ML
1000 INJECTION INTRAMUSCULAR; INTRAVENOUS; SUBCUTANEOUS ONCE
Qty: 0 | Refills: 0 | Status: COMPLETED | OUTPATIENT
Start: 2017-03-06 | End: 2017-03-06

## 2017-03-06 RX ORDER — DOCUSATE SODIUM 100 MG
100 CAPSULE ORAL THREE TIMES A DAY
Qty: 0 | Refills: 0 | Status: DISCONTINUED | OUTPATIENT
Start: 2017-03-06 | End: 2017-03-08

## 2017-03-06 RX ORDER — GLUCAGON INJECTION, SOLUTION 0.5 MG/.1ML
1 INJECTION, SOLUTION SUBCUTANEOUS ONCE
Qty: 0 | Refills: 0 | Status: DISCONTINUED | OUTPATIENT
Start: 2017-03-06 | End: 2017-03-08

## 2017-03-06 RX ORDER — INSULIN LISPRO 100/ML
VIAL (ML) SUBCUTANEOUS AT BEDTIME
Qty: 0 | Refills: 0 | Status: DISCONTINUED | OUTPATIENT
Start: 2017-03-06 | End: 2017-03-08

## 2017-03-06 RX ORDER — SERTRALINE 25 MG/1
1 TABLET, FILM COATED ORAL
Qty: 0 | Refills: 0 | COMMUNITY

## 2017-03-06 RX ORDER — OXYCODONE HYDROCHLORIDE 5 MG/1
5 TABLET ORAL EVERY 4 HOURS
Qty: 0 | Refills: 0 | Status: DISCONTINUED | OUTPATIENT
Start: 2017-03-06 | End: 2017-03-07

## 2017-03-06 RX ORDER — MORPHINE SULFATE 50 MG/1
0.1 CAPSULE, EXTENDED RELEASE ORAL ONCE
Qty: 0 | Refills: 0 | Status: DISCONTINUED | OUTPATIENT
Start: 2017-03-06 | End: 2017-03-06

## 2017-03-06 RX ORDER — POLYETHYLENE GLYCOL 3350 17 G/17G
17 POWDER, FOR SOLUTION ORAL DAILY
Qty: 0 | Refills: 0 | Status: DISCONTINUED | OUTPATIENT
Start: 2017-03-06 | End: 2017-03-08

## 2017-03-06 RX ORDER — DEXTROSE 50 % IN WATER 50 %
25 SYRINGE (ML) INTRAVENOUS ONCE
Qty: 0 | Refills: 0 | Status: DISCONTINUED | OUTPATIENT
Start: 2017-03-06 | End: 2017-03-08

## 2017-03-06 RX ORDER — MORPHINE SULFATE 50 MG/1
4 CAPSULE, EXTENDED RELEASE ORAL ONCE
Qty: 0 | Refills: 0 | Status: DISCONTINUED | OUTPATIENT
Start: 2017-03-06 | End: 2017-03-06

## 2017-03-06 RX ORDER — LEVOTHYROXINE SODIUM 125 MCG
1 TABLET ORAL
Qty: 0 | Refills: 0 | COMMUNITY

## 2017-03-06 RX ORDER — ACETAMINOPHEN 500 MG
650 TABLET ORAL EVERY 6 HOURS
Qty: 0 | Refills: 0 | Status: DISCONTINUED | OUTPATIENT
Start: 2017-03-06 | End: 2017-03-08

## 2017-03-06 RX ORDER — VERAPAMIL HCL 240 MG
240 CAPSULE, EXTENDED RELEASE PELLETS 24 HR ORAL DAILY
Qty: 0 | Refills: 0 | Status: DISCONTINUED | OUTPATIENT
Start: 2017-03-06 | End: 2017-03-08

## 2017-03-06 RX ORDER — ATORVASTATIN CALCIUM 80 MG/1
20 TABLET, FILM COATED ORAL AT BEDTIME
Qty: 0 | Refills: 0 | Status: DISCONTINUED | OUTPATIENT
Start: 2017-03-06 | End: 2017-03-08

## 2017-03-06 RX ORDER — SODIUM CHLORIDE 9 MG/ML
1000 INJECTION INTRAMUSCULAR; INTRAVENOUS; SUBCUTANEOUS ONCE
Qty: 0 | Refills: 0 | Status: COMPLETED | OUTPATIENT
Start: 2017-03-07 | End: 2017-03-07

## 2017-03-06 RX ORDER — NALOXONE HYDROCHLORIDE 4 MG/.1ML
0.1 SPRAY NASAL
Qty: 0 | Refills: 0 | Status: DISCONTINUED | OUTPATIENT
Start: 2017-03-06 | End: 2017-03-07

## 2017-03-06 RX ORDER — ALLOPURINOL 300 MG
300 TABLET ORAL DAILY
Qty: 0 | Refills: 0 | Status: DISCONTINUED | OUTPATIENT
Start: 2017-03-06 | End: 2017-03-08

## 2017-03-06 RX ORDER — ONDANSETRON 8 MG/1
4 TABLET, FILM COATED ORAL EVERY 6 HOURS
Qty: 0 | Refills: 0 | Status: DISCONTINUED | OUTPATIENT
Start: 2017-03-06 | End: 2017-03-08

## 2017-03-06 RX ORDER — CEFAZOLIN SODIUM 1 G
2000 VIAL (EA) INJECTION EVERY 8 HOURS
Qty: 0 | Refills: 0 | Status: COMPLETED | OUTPATIENT
Start: 2017-03-06 | End: 2017-03-07

## 2017-03-06 RX ADMIN — SODIUM CHLORIDE 150 MILLILITER(S): 9 INJECTION, SOLUTION INTRAVENOUS at 13:27

## 2017-03-06 RX ADMIN — Medication 100 MILLIGRAM(S): at 17:23

## 2017-03-06 RX ADMIN — PANTOPRAZOLE SODIUM 40 MILLIGRAM(S): 20 TABLET, DELAYED RELEASE ORAL at 18:32

## 2017-03-06 RX ADMIN — TRAMADOL HYDROCHLORIDE 50 MILLIGRAM(S): 50 TABLET ORAL at 09:51

## 2017-03-06 RX ADMIN — Medication 325 MILLIGRAM(S): at 17:22

## 2017-03-06 RX ADMIN — Medication 650 MILLIGRAM(S): at 17:23

## 2017-03-06 RX ADMIN — Medication 4: at 17:23

## 2017-03-06 RX ADMIN — Medication 650 MILLIGRAM(S): at 23:37

## 2017-03-06 RX ADMIN — TRAMADOL HYDROCHLORIDE 50 MILLIGRAM(S): 50 TABLET ORAL at 18:32

## 2017-03-06 RX ADMIN — SODIUM CHLORIDE 3 MILLILITER(S): 9 INJECTION INTRAMUSCULAR; INTRAVENOUS; SUBCUTANEOUS at 13:26

## 2017-03-06 RX ADMIN — SENNA PLUS 2 TABLET(S): 8.6 TABLET ORAL at 22:22

## 2017-03-06 RX ADMIN — MORPHINE SULFATE 0.1 MILLIGRAM(S): 50 CAPSULE, EXTENDED RELEASE ORAL at 10:30

## 2017-03-06 RX ADMIN — Medication 300 MILLIGRAM(S): at 17:22

## 2017-03-06 RX ADMIN — Medication 15 MILLIGRAM(S): at 22:22

## 2017-03-06 RX ADMIN — PANTOPRAZOLE SODIUM 40 MILLIGRAM(S): 20 TABLET, DELAYED RELEASE ORAL at 09:52

## 2017-03-06 RX ADMIN — GABAPENTIN 100 MILLIGRAM(S): 400 CAPSULE ORAL at 09:51

## 2017-03-06 RX ADMIN — Medication 4: at 22:21

## 2017-03-06 RX ADMIN — SODIUM CHLORIDE 3 MILLILITER(S): 9 INJECTION INTRAMUSCULAR; INTRAVENOUS; SUBCUTANEOUS at 22:22

## 2017-03-06 RX ADMIN — SODIUM CHLORIDE 1000 MILLILITER(S): 9 INJECTION INTRAMUSCULAR; INTRAVENOUS; SUBCUTANEOUS at 14:47

## 2017-03-06 RX ADMIN — ATORVASTATIN CALCIUM 20 MILLIGRAM(S): 80 TABLET, FILM COATED ORAL at 22:22

## 2017-03-06 RX ADMIN — SODIUM CHLORIDE 150 MILLILITER(S): 9 INJECTION, SOLUTION INTRAVENOUS at 17:24

## 2017-03-06 RX ADMIN — SERTRALINE 100 MILLIGRAM(S): 25 TABLET, FILM COATED ORAL at 17:22

## 2017-03-06 RX ADMIN — TRAMADOL HYDROCHLORIDE 50 MILLIGRAM(S): 50 TABLET ORAL at 22:22

## 2017-03-06 RX ADMIN — Medication 975 MILLIGRAM(S): at 09:54

## 2017-03-06 NOTE — PHYSICAL THERAPY INITIAL EVALUATION ADULT - PERTINENT HX OF CURRENT PROBLEM, REHAB EVAL
Pt is a 71 yrs old female with h/o Osteoarthritis and with c/o right hip pain for many years presented for preop eval to have right total hip replacement direct anterior approach on 2/6/17.

## 2017-03-06 NOTE — BRIEF OPERATIVE NOTE - PRE-OP DX
Diabetes mellitus  02/21/2017    Active  Shirley Jay  Hypertension  02/21/2017    Active  Shirley Jay  Primary osteoarthritis of right hip  03/06/2017    Active  Savannah Bundy

## 2017-03-07 ENCOUNTER — TRANSCRIPTION ENCOUNTER (OUTPATIENT)
Age: 72
End: 2017-03-07

## 2017-03-07 LAB
BUN SERPL-MCNC: 35 MG/DL — HIGH (ref 7–23)
CALCIUM SERPL-MCNC: 8.6 MG/DL — SIGNIFICANT CHANGE UP (ref 8.4–10.5)
CHLORIDE SERPL-SCNC: 102 MMOL/L — SIGNIFICANT CHANGE UP (ref 98–107)
CO2 SERPL-SCNC: 18 MMOL/L — LOW (ref 22–31)
CREAT SERPL-MCNC: 0.94 MG/DL — SIGNIFICANT CHANGE UP (ref 0.5–1.3)
GLUCOSE SERPL-MCNC: 199 MG/DL — HIGH (ref 70–99)
HCT VFR BLD CALC: 25.9 % — LOW (ref 34.5–45)
HGB BLD-MCNC: 8.3 G/DL — LOW (ref 11.5–15.5)
MCHC RBC-ENTMCNC: 28.6 PG — SIGNIFICANT CHANGE UP (ref 27–34)
MCHC RBC-ENTMCNC: 32 % — SIGNIFICANT CHANGE UP (ref 32–36)
MCV RBC AUTO: 89.3 FL — SIGNIFICANT CHANGE UP (ref 80–100)
PLATELET # BLD AUTO: 196 K/UL — SIGNIFICANT CHANGE UP (ref 150–400)
PMV BLD: 9.8 FL — SIGNIFICANT CHANGE UP (ref 7–13)
POTASSIUM SERPL-MCNC: 5.3 MMOL/L — SIGNIFICANT CHANGE UP (ref 3.5–5.3)
POTASSIUM SERPL-SCNC: 5.3 MMOL/L — SIGNIFICANT CHANGE UP (ref 3.5–5.3)
RBC # BLD: 2.9 M/UL — LOW (ref 3.8–5.2)
RBC # FLD: 15.2 % — HIGH (ref 10.3–14.5)
SODIUM SERPL-SCNC: 137 MMOL/L — SIGNIFICANT CHANGE UP (ref 135–145)
WBC # BLD: 12.45 K/UL — HIGH (ref 3.8–10.5)
WBC # FLD AUTO: 12.45 K/UL — HIGH (ref 3.8–10.5)

## 2017-03-07 PROCEDURE — 99223 1ST HOSP IP/OBS HIGH 75: CPT

## 2017-03-07 RX ORDER — DOCUSATE SODIUM 100 MG
1 CAPSULE ORAL
Qty: 0 | Refills: 0 | COMMUNITY
Start: 2017-03-07

## 2017-03-07 RX ORDER — TRAMADOL HYDROCHLORIDE 50 MG/1
1 TABLET ORAL
Qty: 21 | Refills: 0 | OUTPATIENT
Start: 2017-03-07 | End: 2017-03-14

## 2017-03-07 RX ORDER — SENNA PLUS 8.6 MG/1
2 TABLET ORAL
Qty: 10 | Refills: 0 | OUTPATIENT
Start: 2017-03-07 | End: 2017-03-12

## 2017-03-07 RX ORDER — ASPIRIN/CALCIUM CARB/MAGNESIUM 324 MG
1 TABLET ORAL
Qty: 90 | Refills: 0 | OUTPATIENT
Start: 2017-03-07 | End: 2017-04-21

## 2017-03-07 RX ORDER — MORPHINE SULFATE 50 MG/1
2 CAPSULE, EXTENDED RELEASE ORAL EVERY 4 HOURS
Qty: 0 | Refills: 0 | Status: DISCONTINUED | OUTPATIENT
Start: 2017-03-07 | End: 2017-03-08

## 2017-03-07 RX ORDER — OXYCODONE HYDROCHLORIDE 5 MG/1
5 TABLET ORAL EVERY 4 HOURS
Qty: 0 | Refills: 0 | Status: DISCONTINUED | OUTPATIENT
Start: 2017-03-07 | End: 2017-03-08

## 2017-03-07 RX ORDER — MELOXICAM 15 MG/1
1 TABLET ORAL
Qty: 30 | Refills: 0 | OUTPATIENT
Start: 2017-03-07 | End: 2017-04-06

## 2017-03-07 RX ORDER — OXYCODONE HYDROCHLORIDE 5 MG/1
10 TABLET ORAL EVERY 4 HOURS
Qty: 0 | Refills: 0 | Status: DISCONTINUED | OUTPATIENT
Start: 2017-03-07 | End: 2017-03-08

## 2017-03-07 RX ADMIN — Medication 650 MILLIGRAM(S): at 05:48

## 2017-03-07 RX ADMIN — Medication 650 MILLIGRAM(S): at 23:24

## 2017-03-07 RX ADMIN — OXYCODONE HYDROCHLORIDE 5 MILLIGRAM(S): 5 TABLET ORAL at 04:50

## 2017-03-07 RX ADMIN — OXYCODONE HYDROCHLORIDE 10 MILLIGRAM(S): 5 TABLET ORAL at 13:00

## 2017-03-07 RX ADMIN — SODIUM CHLORIDE 3 MILLILITER(S): 9 INJECTION INTRAMUSCULAR; INTRAVENOUS; SUBCUTANEOUS at 22:37

## 2017-03-07 RX ADMIN — Medication 650 MILLIGRAM(S): at 12:07

## 2017-03-07 RX ADMIN — SODIUM CHLORIDE 3 MILLILITER(S): 9 INJECTION INTRAMUSCULAR; INTRAVENOUS; SUBCUTANEOUS at 13:55

## 2017-03-07 RX ADMIN — OXYCODONE HYDROCHLORIDE 10 MILLIGRAM(S): 5 TABLET ORAL at 23:35

## 2017-03-07 RX ADMIN — Medication 15 MILLIGRAM(S): at 05:47

## 2017-03-07 RX ADMIN — ATORVASTATIN CALCIUM 20 MILLIGRAM(S): 80 TABLET, FILM COATED ORAL at 22:43

## 2017-03-07 RX ADMIN — SENNA PLUS 2 TABLET(S): 8.6 TABLET ORAL at 22:43

## 2017-03-07 RX ADMIN — TRAMADOL HYDROCHLORIDE 50 MILLIGRAM(S): 50 TABLET ORAL at 05:48

## 2017-03-07 RX ADMIN — SODIUM CHLORIDE 3 MILLILITER(S): 9 INJECTION INTRAMUSCULAR; INTRAVENOUS; SUBCUTANEOUS at 05:51

## 2017-03-07 RX ADMIN — Medication 100 MILLIGRAM(S): at 22:43

## 2017-03-07 RX ADMIN — Medication 175 MICROGRAM(S): at 05:47

## 2017-03-07 RX ADMIN — OXYCODONE HYDROCHLORIDE 10 MILLIGRAM(S): 5 TABLET ORAL at 17:03

## 2017-03-07 RX ADMIN — Medication 325 MILLIGRAM(S): at 17:18

## 2017-03-07 RX ADMIN — LISINOPRIL 20 MILLIGRAM(S): 2.5 TABLET ORAL at 05:48

## 2017-03-07 RX ADMIN — POLYETHYLENE GLYCOL 3350 17 GRAM(S): 17 POWDER, FOR SOLUTION ORAL at 12:07

## 2017-03-07 RX ADMIN — Medication 325 MILLIGRAM(S): at 05:48

## 2017-03-07 RX ADMIN — SODIUM CHLORIDE 1000 MILLILITER(S): 9 INJECTION INTRAMUSCULAR; INTRAVENOUS; SUBCUTANEOUS at 05:44

## 2017-03-07 RX ADMIN — OXYCODONE HYDROCHLORIDE 5 MILLIGRAM(S): 5 TABLET ORAL at 03:59

## 2017-03-07 RX ADMIN — SERTRALINE 100 MILLIGRAM(S): 25 TABLET, FILM COATED ORAL at 12:07

## 2017-03-07 RX ADMIN — Medication 4: at 17:18

## 2017-03-07 RX ADMIN — OXYCODONE HYDROCHLORIDE 10 MILLIGRAM(S): 5 TABLET ORAL at 22:43

## 2017-03-07 RX ADMIN — TRAMADOL HYDROCHLORIDE 50 MILLIGRAM(S): 50 TABLET ORAL at 13:56

## 2017-03-07 RX ADMIN — OXYCODONE HYDROCHLORIDE 10 MILLIGRAM(S): 5 TABLET ORAL at 12:07

## 2017-03-07 RX ADMIN — Medication 300 MILLIGRAM(S): at 22:43

## 2017-03-07 RX ADMIN — Medication 2: at 08:53

## 2017-03-07 RX ADMIN — TRAMADOL HYDROCHLORIDE 50 MILLIGRAM(S): 50 TABLET ORAL at 22:43

## 2017-03-07 RX ADMIN — Medication 650 MILLIGRAM(S): at 17:18

## 2017-03-07 RX ADMIN — Medication 100 MILLIGRAM(S): at 13:55

## 2017-03-07 RX ADMIN — Medication 100 MILLIGRAM(S): at 01:32

## 2017-03-07 RX ADMIN — PANTOPRAZOLE SODIUM 40 MILLIGRAM(S): 20 TABLET, DELAYED RELEASE ORAL at 12:07

## 2017-03-07 RX ADMIN — OXYCODONE HYDROCHLORIDE 10 MILLIGRAM(S): 5 TABLET ORAL at 18:00

## 2017-03-07 RX ADMIN — Medication 100 MILLIGRAM(S): at 05:48

## 2017-03-07 NOTE — DISCHARGE NOTE ADULT - CARE PLAN
Principal Discharge DX:	Osteoarthritis  Goal:	Pain reduction, improve ambulation and ADLs  Instructions for follow-up, activity and diet:	Please follow up with Dr Clement in 1 week. Call office to make an appointment. Please follow up with your PMD for continuity of care and management. Please keep aquacel dressing in place until post op day # 14. Any sutures/staples to be removed on post op day # 14.  Please take aspirin twice daily for DVT PPX. Weight bearing as tolerated. Call orthopaedics with any questions.

## 2017-03-07 NOTE — OCCUPATIONAL THERAPY INITIAL EVALUATION ADULT - PERTINENT HX OF CURRENT PROBLEM, REHAB EVAL
Pt is a 65 year old obese female who denies medical history. Pt with c/o pain to groin since March 2016. Pt denies trauma/fall. Pt states she was evaluated by Orthopedist and was referred for physical therapy, yet symptoms remained. Pt is now s/p Left Total Hip Replacement on 3/06/2017. Pt is 71 year old female with hx of Osteoarthritis and with c/o right hip pain for many years. Surgery was previously scheduled , then cancelled as pt had a cold which has not resolved. Surgery rescheduled. Pt is now s/p right total hip replacement direct anterior approach on 3/6/17.

## 2017-03-07 NOTE — DISCHARGE NOTE ADULT - HOSPITAL COURSE
Patient is a 72 y/o female s/p right hip arthroplasty on 3/6/17. Patient tolerated the procedure well without any intraoperative complications. Patient states pain is controlled. Tolerated Physical Therapy well. As per Dr Clement patient is stable and ready for discharge.   Please follow up with Dr Clement in 1 week. Call office to make an appointment. Please follow up with your PMD for continuity of care and management. Please keep aquacel dressing in place until post op day # 14. Any sutures/staples to be removed on post op day # 14.  Please take aspirin twice daily for DVT PPX. Weight bearing as tolerated. Call orthopaedics with any questions.

## 2017-03-07 NOTE — OCCUPATIONAL THERAPY INITIAL EVALUATION ADULT - MD ORDER
Occupational Therapy to evaluate and treat. Occupational Therapy to evaluate and treat. OOB to Chair. Ambulate with walker. Ambulate as Tolerated.

## 2017-03-07 NOTE — DISCHARGE NOTE ADULT - NS AS DC FOLLOWUP STROKE INST
total hip, exercise worksheet, anterior precautions, ecotrin, mobic, percocet, senna, tramadol, colace

## 2017-03-07 NOTE — DISCHARGE NOTE ADULT - CARE PROVIDERS DIRECT ADDRESSES
[email or web address], [email or web address]."[email or web address][email or web address]."[email or web address]".net

## 2017-03-07 NOTE — DISCHARGE NOTE ADULT - PATIENT PORTAL LINK FT
“You can access the Follow[facility] Patient Portal, offered by [facility], by registering with the following website: [email or web address]”

## 2017-03-07 NOTE — DISCHARGE NOTE ADULT - INSTRUCTIONS
may resume diet as prior to surgery You have a post op appointment with Dr. Clement on March 23, 2017 @ 8:30am in the [address] [place] [address] office. If you are unable to keep this appointment, please call the office to reschedule. Do your exercises as instructed, maintaining anterior hip precautions. No heavy lifting, bending, or straining to move your bowels. Take over the counter stool softeners as needed to prevent constipation which may be caused by pain medication. Your A1C= 6.5. continue to follow a concentrated carbohydrate diet and take your medications for diabetes.

## 2017-03-07 NOTE — OCCUPATIONAL THERAPY INITIAL EVALUATION ADULT - LIVES WITH, PROFILE
Pt. reports she lives with her son and daughter in a house with 4 steps to enter. Once inside, pt. reports she has no steps to negotiate and bedroom and bathroom are located on the main level. Per pt., she has a shower stall in her bathroom with shower chair available.

## 2017-03-07 NOTE — DISCHARGE NOTE ADULT - PLAN OF CARE
Pain reduction, improve ambulation and ADLs Please follow up with Dr Clement in 1 week. Call office to make an appointment. Please follow up with your PMD for continuity of care and management. Please keep aquacel dressing in place until post op day # 14. Any sutures/staples to be removed on post op day # 14.  Please take aspirin twice daily for DVT PPX. Weight bearing as tolerated. Call orthopaedics with any questions.

## 2017-03-07 NOTE — OCCUPATIONAL THERAPY INITIAL EVALUATION ADULT - ANTICIPATED DISCHARGE DISPOSITION, OT EVAL
Pt. may benefit from Home w/ OT. The patient should be evaluated upon discharge for the need for home OT services. Pt. reports her son is available at home during day to assist her with ADL’s as needed.

## 2017-03-07 NOTE — OCCUPATIONAL THERAPY INITIAL EVALUATION ADULT - NS ASR TOILETING EQUIP NEEDS
3:1 commode/Pt. to be educated on benefits and how to privately purchase during upcoming ADL session.

## 2017-03-07 NOTE — DISCHARGE NOTE ADULT - CARE PROVIDER_API CALL
Fran Clement), Orthopaedic Surgery  [address] [place] [address]  Phone: [phone]  Fax: [phone]

## 2017-03-07 NOTE — DISCHARGE NOTE ADULT - MEDICATION SUMMARY - MEDICATIONS TO TAKE
I will START or STAY ON the medications listed below when I get home from the hospital:    Mobic 15 mg oral tablet  -- 1 tab(s) by mouth once a day MDD:1  -- Indication: For Pain med     Percocet 5/325 oral tablet  -- 1-2 tab(s) by mouth every 6 hours PRN Pain MDD:8  -- Indication: For Pain med     traMADol 50 mg oral tablet  -- 1 tab(s) by mouth every 8 hours MDD:3  -- Indication: For Pain med     aspirin 325 mg oral delayed release tablet  -- 1 tab(s) by mouth 2 times a day MDD:2  -- Indication: For DVT Prophlyaxis     verapamil 240 mg/24 hours oral capsule, extended release  -- 1 cap(s) by mouth once a day pm  -- Indication: For Home med     sertraline 100 mg oral tablet  -- 1 tab(s) by mouth once a day pm  -- Indication: For Depression     metFORMIN 850 mg oral tablet  -- 1/2 tab  by mouth twice daily  -- Indication: For DM     allopurinol 300 mg oral tablet  -- 1 tab(s) by mouth once a day pm  -- Indication: For Gout     atorvastatin 20 mg oral tablet  -- 1 tab(s) by mouth once a day pm  -- Indication: For HLD    lisinopril-hydroCHLOROthiazide 20mg-12.5mg oral tablet  -- 1 tab(s) by mouth once a day am  -- Indication: For HTN (hypertension)    docusate sodium 100 mg oral capsule  -- 1 cap(s) by mouth 3 times a day  -- Indication: For Stool softener     senna oral tablet  -- 2 tab(s) by mouth once a day (at bedtime) MDD:2  -- Indication: For Stool softener     levothyroxine 175 mcg (0.175 mg) oral tablet  -- 1 tab(s) by mouth once a day am  -- Indication: For Hypothyroidism

## 2017-03-07 NOTE — OCCUPATIONAL THERAPY INITIAL EVALUATION ADULT - NS ASR DRESSING EQUIP NEEDS
Pt. to be provided with Hip Kit & educated on the use of the reacher, dressing stick, sock aid, and long handled shoe horn using compensatory strategies during upcoming ADL session.

## 2017-03-08 ENCOUNTER — TRANSCRIPTION ENCOUNTER (OUTPATIENT)
Age: 72
End: 2017-03-08

## 2017-03-08 VITALS
DIASTOLIC BLOOD PRESSURE: 74 MMHG | OXYGEN SATURATION: 99 % | RESPIRATION RATE: 18 BRPM | HEART RATE: 94 BPM | SYSTOLIC BLOOD PRESSURE: 149 MMHG | TEMPERATURE: 98 F

## 2017-03-08 LAB
BUN SERPL-MCNC: 25 MG/DL — HIGH (ref 7–23)
CALCIUM SERPL-MCNC: 9.5 MG/DL — SIGNIFICANT CHANGE UP (ref 8.4–10.5)
CHLORIDE SERPL-SCNC: 107 MMOL/L — SIGNIFICANT CHANGE UP (ref 98–107)
CO2 SERPL-SCNC: 24 MMOL/L — SIGNIFICANT CHANGE UP (ref 22–31)
CREAT SERPL-MCNC: 0.68 MG/DL — SIGNIFICANT CHANGE UP (ref 0.5–1.3)
GLUCOSE SERPL-MCNC: 129 MG/DL — HIGH (ref 70–99)
HCT VFR BLD CALC: 28.4 % — LOW (ref 34.5–45)
HGB BLD-MCNC: 9 G/DL — LOW (ref 11.5–15.5)
MCHC RBC-ENTMCNC: 28.2 PG — SIGNIFICANT CHANGE UP (ref 27–34)
MCHC RBC-ENTMCNC: 31.7 % — LOW (ref 32–36)
MCV RBC AUTO: 89 FL — SIGNIFICANT CHANGE UP (ref 80–100)
PLATELET # BLD AUTO: 201 K/UL — SIGNIFICANT CHANGE UP (ref 150–400)
PMV BLD: 9.4 FL — SIGNIFICANT CHANGE UP (ref 7–13)
POTASSIUM SERPL-MCNC: 5.2 MMOL/L — SIGNIFICANT CHANGE UP (ref 3.5–5.3)
POTASSIUM SERPL-SCNC: 5.2 MMOL/L — SIGNIFICANT CHANGE UP (ref 3.5–5.3)
RBC # BLD: 3.19 M/UL — LOW (ref 3.8–5.2)
RBC # FLD: 15.2 % — HIGH (ref 10.3–14.5)
SODIUM SERPL-SCNC: 143 MMOL/L — SIGNIFICANT CHANGE UP (ref 135–145)
WBC # BLD: 9.49 K/UL — SIGNIFICANT CHANGE UP (ref 3.8–10.5)
WBC # FLD AUTO: 9.49 K/UL — SIGNIFICANT CHANGE UP (ref 3.8–10.5)

## 2017-03-08 PROCEDURE — 99233 SBSQ HOSP IP/OBS HIGH 50: CPT

## 2017-03-08 PROCEDURE — 99238 HOSP IP/OBS DSCHRG MGMT 30/<: CPT

## 2017-03-08 RX ADMIN — TRAMADOL HYDROCHLORIDE 50 MILLIGRAM(S): 50 TABLET ORAL at 13:00

## 2017-03-08 RX ADMIN — SERTRALINE 100 MILLIGRAM(S): 25 TABLET, FILM COATED ORAL at 13:00

## 2017-03-08 RX ADMIN — OXYCODONE HYDROCHLORIDE 5 MILLIGRAM(S): 5 TABLET ORAL at 10:37

## 2017-03-08 RX ADMIN — SODIUM CHLORIDE 3 MILLILITER(S): 9 INJECTION INTRAMUSCULAR; INTRAVENOUS; SUBCUTANEOUS at 13:02

## 2017-03-08 RX ADMIN — TRAMADOL HYDROCHLORIDE 50 MILLIGRAM(S): 50 TABLET ORAL at 06:29

## 2017-03-08 RX ADMIN — OXYCODONE HYDROCHLORIDE 10 MILLIGRAM(S): 5 TABLET ORAL at 06:28

## 2017-03-08 RX ADMIN — Medication 175 MICROGRAM(S): at 06:28

## 2017-03-08 RX ADMIN — POLYETHYLENE GLYCOL 3350 17 GRAM(S): 17 POWDER, FOR SOLUTION ORAL at 13:00

## 2017-03-08 RX ADMIN — Medication 300 MILLIGRAM(S): at 13:00

## 2017-03-08 RX ADMIN — LISINOPRIL 20 MILLIGRAM(S): 2.5 TABLET ORAL at 06:28

## 2017-03-08 RX ADMIN — CELECOXIB 200 MILLIGRAM(S): 200 CAPSULE ORAL at 08:26

## 2017-03-08 RX ADMIN — OXYCODONE HYDROCHLORIDE 10 MILLIGRAM(S): 5 TABLET ORAL at 07:20

## 2017-03-08 RX ADMIN — Medication 100 MILLIGRAM(S): at 13:00

## 2017-03-08 RX ADMIN — Medication 650 MILLIGRAM(S): at 06:28

## 2017-03-08 RX ADMIN — Medication 650 MILLIGRAM(S): at 13:00

## 2017-03-08 RX ADMIN — SODIUM CHLORIDE 3 MILLILITER(S): 9 INJECTION INTRAMUSCULAR; INTRAVENOUS; SUBCUTANEOUS at 06:27

## 2017-03-08 RX ADMIN — Medication 325 MILLIGRAM(S): at 06:28

## 2017-03-08 RX ADMIN — PANTOPRAZOLE SODIUM 40 MILLIGRAM(S): 20 TABLET, DELAYED RELEASE ORAL at 13:00

## 2017-03-08 RX ADMIN — OXYCODONE HYDROCHLORIDE 5 MILLIGRAM(S): 5 TABLET ORAL at 11:30

## 2017-03-08 RX ADMIN — Medication 100 MILLIGRAM(S): at 06:28

## 2017-03-21 LAB — SURGICAL PATHOLOGY STUDY: SIGNIFICANT CHANGE UP

## 2017-03-22 ENCOUNTER — RESULT REVIEW (OUTPATIENT)
Age: 72
End: 2017-03-22

## 2017-03-23 ENCOUNTER — APPOINTMENT (OUTPATIENT)
Dept: ORTHOPEDIC SURGERY | Facility: CLINIC | Age: 72
End: 2017-03-23

## 2017-03-23 VITALS
WEIGHT: 190 LBS | SYSTOLIC BLOOD PRESSURE: 187 MMHG | DIASTOLIC BLOOD PRESSURE: 75 MMHG | HEIGHT: 63 IN | HEART RATE: 91 BPM | BODY MASS INDEX: 33.66 KG/M2

## 2017-03-23 RX ORDER — OXYCODONE AND ACETAMINOPHEN 5; 325 MG/1; MG/1
5-325 TABLET ORAL
Qty: 30 | Refills: 0 | Status: ACTIVE | COMMUNITY
Start: 2017-03-23 | End: 1900-01-01

## 2017-03-23 RX ORDER — STANDARDIZED SENNA CONCENTRATE 8.6 MG/1
8.6 TABLET ORAL
Qty: 30 | Refills: 0 | Status: ACTIVE | COMMUNITY
Start: 2017-03-23 | End: 1900-01-01

## 2017-03-23 RX ORDER — TRAMADOL HYDROCHLORIDE 50 MG/1
50 TABLET, COATED ORAL
Qty: 90 | Refills: 0 | Status: ACTIVE | COMMUNITY
Start: 2017-03-23 | End: 1900-01-01

## 2017-03-23 RX ORDER — MELOXICAM 15 MG/1
15 TABLET ORAL
Qty: 30 | Refills: 1 | Status: ACTIVE | COMMUNITY
Start: 2017-03-23 | End: 1900-01-01

## 2017-05-18 ENCOUNTER — APPOINTMENT (OUTPATIENT)
Dept: ORTHOPEDIC SURGERY | Facility: CLINIC | Age: 72
End: 2017-05-18

## 2017-05-18 ENCOUNTER — RX RENEWAL (OUTPATIENT)
Age: 72
End: 2017-05-18

## 2017-05-18 VITALS — HEIGHT: 63 IN | WEIGHT: 190 LBS | BODY MASS INDEX: 33.66 KG/M2

## 2017-05-18 RX ORDER — MELOXICAM 15 MG/1
15 TABLET ORAL DAILY
Qty: 90 | Refills: 1 | Status: ACTIVE | COMMUNITY
Start: 2017-05-18 | End: 1900-01-01

## 2017-05-18 RX ORDER — TIZANIDINE HYDROCHLORIDE 2 MG/1
2 CAPSULE ORAL
Qty: 30 | Refills: 1 | Status: ACTIVE | COMMUNITY
Start: 2017-05-18 | End: 1900-01-01

## 2017-05-18 RX ORDER — TIZANIDINE 4 MG/1
4 TABLET ORAL
Qty: 270 | Refills: 0 | Status: ACTIVE | COMMUNITY
Start: 2017-05-18 | End: 1900-01-01

## 2017-06-15 ENCOUNTER — RX RENEWAL (OUTPATIENT)
Age: 72
End: 2017-06-15

## 2017-06-15 ENCOUNTER — APPOINTMENT (OUTPATIENT)
Dept: ORTHOPEDIC SURGERY | Facility: CLINIC | Age: 72
End: 2017-06-15

## 2017-06-15 VITALS
SYSTOLIC BLOOD PRESSURE: 168 MMHG | BODY MASS INDEX: 33.66 KG/M2 | HEART RATE: 75 BPM | HEIGHT: 63 IN | DIASTOLIC BLOOD PRESSURE: 75 MMHG | WEIGHT: 190 LBS

## 2017-06-15 RX ORDER — TIZANIDINE 4 MG/1
4 TABLET ORAL
Qty: 270 | Refills: 0 | Status: ACTIVE | COMMUNITY
Start: 2017-06-15 | End: 1900-01-01

## 2017-09-28 ENCOUNTER — APPOINTMENT (OUTPATIENT)
Dept: ORTHOPEDIC SURGERY | Facility: CLINIC | Age: 72
End: 2017-09-28
Payer: MEDICARE

## 2017-09-28 VITALS — HEART RATE: 80 BPM | SYSTOLIC BLOOD PRESSURE: 145 MMHG | DIASTOLIC BLOOD PRESSURE: 74 MMHG

## 2017-09-28 PROCEDURE — 99214 OFFICE O/P EST MOD 30 MIN: CPT

## 2018-03-15 ENCOUNTER — APPOINTMENT (OUTPATIENT)
Dept: ORTHOPEDIC SURGERY | Facility: CLINIC | Age: 73
End: 2018-03-15
Payer: MEDICARE

## 2018-03-15 DIAGNOSIS — M16.12 UNILATERAL PRIMARY OSTEOARTHRITIS, LEFT HIP: ICD-10-CM

## 2018-03-15 DIAGNOSIS — M25.512 PAIN IN LEFT SHOULDER: ICD-10-CM

## 2018-03-15 PROCEDURE — 73030 X-RAY EXAM OF SHOULDER: CPT | Mod: LT

## 2018-03-15 PROCEDURE — 73521 X-RAY EXAM HIPS BI 2 VIEWS: CPT

## 2018-03-15 PROCEDURE — 99214 OFFICE O/P EST MOD 30 MIN: CPT

## 2018-04-25 PROBLEM — M16.12 PRIMARY LOCALIZED OSTEOARTHROSIS OF LEFT HIP: Status: ACTIVE | Noted: 2018-03-15

## 2018-11-26 NOTE — H&P PST ADULT - NEGATIVE ENMT SYMPTOMS
Refilled per protocol   no dysphagia/no sinus symptoms/no post-nasal discharge/no gum bleeding/no nasal discharge/no recurrent cold sores/no nasal congestion/no tinnitus/no throat pain/no dry mouth/no nose bleeds/no nasal obstruction/no hearing difficulty/no abnormal taste sensation/no vertigo/no ear pain

## 2018-11-29 ENCOUNTER — APPOINTMENT (OUTPATIENT)
Dept: ORTHOPEDIC SURGERY | Facility: CLINIC | Age: 73
End: 2018-11-29
Payer: MEDICARE

## 2018-11-29 ENCOUNTER — RX RENEWAL (OUTPATIENT)
Age: 73
End: 2018-11-29

## 2018-11-29 VITALS
HEIGHT: 63 IN | HEART RATE: 87 BPM | SYSTOLIC BLOOD PRESSURE: 132 MMHG | DIASTOLIC BLOOD PRESSURE: 69 MMHG | WEIGHT: 192 LBS | BODY MASS INDEX: 34.02 KG/M2

## 2018-11-29 DIAGNOSIS — M47.817 SPONDYLOSIS W/OUT MYELOPATHY OR RADICULOPATHY, LUMBOSACRAL REGION: ICD-10-CM

## 2018-11-29 DIAGNOSIS — M16.12 UNILATERAL PRIMARY OSTEOARTHRITIS, LEFT HIP: ICD-10-CM

## 2018-11-29 DIAGNOSIS — Z96.651 PRESENCE OF RIGHT ARTIFICIAL KNEE JOINT: ICD-10-CM

## 2018-11-29 PROCEDURE — 99214 OFFICE O/P EST MOD 30 MIN: CPT

## 2018-11-29 PROCEDURE — 73521 X-RAY EXAM HIPS BI 2 VIEWS: CPT

## 2018-11-29 PROCEDURE — 72100 X-RAY EXAM L-S SPINE 2/3 VWS: CPT

## 2018-11-29 PROCEDURE — 73562 X-RAY EXAM OF KNEE 3: CPT | Mod: LT

## 2018-11-29 RX ORDER — MELOXICAM 15 MG/1
15 TABLET ORAL DAILY
Qty: 90 | Refills: 0 | Status: ACTIVE | COMMUNITY
Start: 2018-11-29 | End: 1900-01-01

## 2018-12-03 ENCOUNTER — RX RENEWAL (OUTPATIENT)
Age: 73
End: 2018-12-03

## 2018-12-13 ENCOUNTER — APPOINTMENT (OUTPATIENT)
Dept: ORTHOPEDIC SURGERY | Facility: CLINIC | Age: 73
End: 2018-12-13
Payer: MEDICARE

## 2018-12-13 PROCEDURE — 20610 DRAIN/INJ JOINT/BURSA W/O US: CPT | Mod: LT

## 2018-12-17 PROBLEM — M47.817 LUMBOSACRAL SPONDYLOSIS: Status: ACTIVE | Noted: 2017-01-12

## 2018-12-17 PROBLEM — Z96.651 HISTORY OF KNEE REPLACEMENT, TOTAL, RIGHT: Status: ACTIVE | Noted: 2018-12-17

## 2018-12-17 PROBLEM — M16.12 PRIMARY LOCALIZED OSTEOARTHROSIS OF LEFT HIP: Status: ACTIVE | Noted: 2018-03-15

## 2018-12-20 ENCOUNTER — APPOINTMENT (OUTPATIENT)
Dept: ORTHOPEDIC SURGERY | Facility: CLINIC | Age: 73
End: 2018-12-20
Payer: MEDICARE

## 2018-12-20 DIAGNOSIS — M17.12 UNILATERAL PRIMARY OSTEOARTHRITIS, LEFT KNEE: ICD-10-CM

## 2018-12-20 PROCEDURE — 20610 DRAIN/INJ JOINT/BURSA W/O US: CPT | Mod: LT

## 2018-12-27 ENCOUNTER — APPOINTMENT (OUTPATIENT)
Dept: ORTHOPEDIC SURGERY | Facility: CLINIC | Age: 73
End: 2018-12-27
Payer: MEDICARE

## 2018-12-27 DIAGNOSIS — Z96.641 PRESENCE OF RIGHT ARTIFICIAL HIP JOINT: ICD-10-CM

## 2018-12-27 PROCEDURE — 20610 DRAIN/INJ JOINT/BURSA W/O US: CPT | Mod: LT

## 2018-12-31 ENCOUNTER — RX RENEWAL (OUTPATIENT)
Age: 73
End: 2018-12-31

## 2018-12-31 RX ORDER — MELOXICAM 15 MG/1
15 TABLET ORAL
Qty: 30 | Refills: 0 | Status: ACTIVE | COMMUNITY
Start: 2018-12-03 | End: 1900-01-01

## 2019-01-02 ENCOUNTER — APPOINTMENT (OUTPATIENT)
Dept: ORTHOPEDIC SURGERY | Facility: CLINIC | Age: 74
End: 2019-01-02

## 2019-01-28 ENCOUNTER — RX RENEWAL (OUTPATIENT)
Age: 74
End: 2019-01-28

## 2019-01-28 RX ORDER — MELOXICAM 15 MG/1
15 TABLET ORAL
Qty: 30 | Refills: 0 | Status: ACTIVE | COMMUNITY
Start: 2019-01-28 | End: 1900-01-01

## 2019-02-07 ENCOUNTER — APPOINTMENT (OUTPATIENT)
Dept: ORTHOPEDIC SURGERY | Facility: CLINIC | Age: 74
End: 2019-02-07
Payer: MEDICARE

## 2019-02-07 VITALS
WEIGHT: 190 LBS | SYSTOLIC BLOOD PRESSURE: 129 MMHG | DIASTOLIC BLOOD PRESSURE: 75 MMHG | HEIGHT: 62 IN | BODY MASS INDEX: 34.96 KG/M2 | HEART RATE: 72 BPM

## 2019-02-07 PROCEDURE — 99215 OFFICE O/P EST HI 40 MIN: CPT

## 2019-02-07 NOTE — HISTORY OF PRESENT ILLNESS
[Bending] : worsened by bending [Walking] : worsened by walking [de-identified] : Pt with L5-S1 spondylolisthesis presents today with c/o left hip pain since 11/2018, pain occasionally radiates to lateral aspect of LLE to knee, Pt with hx of neuropathy on B/l sole form DM, Pt states she has difficulty ambulating due to pain. She also states she had B/L buttock pain on 10/2008 and that improved after performing stretching exercises. pt take ibuprofen 600mg BID, this provides mild relief in pain. Pt saw Dr Clement and was diagnosed with left hip DJD and osteoarthritis of left knee. Pt denies having PEMA in the past, Pt does not participate with PT at this time. Pt uses cane for ambulation due to pain.  [Ataxia] : no ataxia [Incontinence] : no incontinence [Loss of Dexterity] : good dexterity [Urinary Ret.] : no urinary retention

## 2019-02-07 NOTE — PHYSICAL EXAM
[ALL] : dorsalis pedis, posterior tibial, femoral, popliteal, and radial 2+ and symmetric bilaterally [UE/LE] : Sensory: Intact in bilateral upper & lower extremities [1+] : left brachioradialis 1+ [0] : left patella 0 [Normal] : Oriented to person, place, and time, insight and judgement were intact and the affect was normal [Gaines's Sign] : negative Gaines's sign [Pronator Drift] : negative pronator drift [SLR] : negative straight leg raise [de-identified] : NTTP L spine and b/l paraspinals L region. \par Skin intact L spine. No rashes, ulcers, blisters. \par No lymphedema. \par Rapid alternating movements- Intact. \par Full and non-painful ROM RUE, LUE, RLE, and LLE. Skin intact RUE, LUE, RLE, and LLE. No rashes, blisters, ulcers. NTTP RUE, LUE, RLE, and LLE. No evidence of dislocation or subluxation B/L.\par  [de-identified] : xray of l Spine - isthmic spondylolisthesis L5S1

## 2019-02-07 NOTE — DISCUSSION/SUMMARY
[de-identified] : 74 yo female with isthmic spondylolisthesis, L5S1, PT, tylenol, bracing. Ibuprofen 800 mg TID. Natural history and treatment discussed.

## 2019-02-11 ENCOUNTER — OTHER (OUTPATIENT)
Age: 74
End: 2019-02-11

## 2019-03-14 ENCOUNTER — APPOINTMENT (OUTPATIENT)
Dept: ORTHOPEDIC SURGERY | Facility: CLINIC | Age: 74
End: 2019-03-14

## 2019-03-21 ENCOUNTER — APPOINTMENT (OUTPATIENT)
Dept: ORTHOPEDIC SURGERY | Facility: CLINIC | Age: 74
End: 2019-03-21
Payer: MEDICARE

## 2019-03-21 DIAGNOSIS — M48.07 SPINAL STENOSIS, LUMBOSACRAL REGION: ICD-10-CM

## 2019-03-21 PROCEDURE — 99214 OFFICE O/P EST MOD 30 MIN: CPT

## 2019-03-21 NOTE — PHYSICAL EXAM
[UE/LE] : Sensory: Intact in bilateral upper & lower extremities [1+] : left brachioradialis 1+ [ALL] : dorsalis pedis, posterior tibial, femoral, popliteal, and radial 2+ and symmetric bilaterally [0] : left patella 0 [Normal] : Oriented to person, place, and time, insight and judgement were intact and the affect was normal [Gaines's Sign] : negative Gaines's sign [Pronator Drift] : negative pronator drift [SLR] : negative straight leg raise [de-identified] : NTTP L spine and b/l paraspinals L region. \par Skin intact L spine. No rashes, ulcers, blisters. \par No lymphedema. \par  [de-identified] : MRI lumbar niltonen Hiralanger Pesiri 02/2019 L34 Stenosis, L5S1 Spondylolisthesis\par xray of l Spine - isthmic spondylolisthesis L5S1

## 2019-03-21 NOTE — HISTORY OF PRESENT ILLNESS
[Bending] : worsened by bending [Walking] : worsened by walking [de-identified] : Pt with isthmic spondylolisthesis, L5S1, treated with Tylenol, PT, NSAIDS and bracing.She presents today for f/u results of lumbar spine MRI. Pt states symptoms are improving over time. Pain occasionally radiates to lateral aspect of LLE to knee. pt take ibuprofen 600mg BID, this provides mild relief in pain. Pt participates with PT X2/week. Pt uses cane for balance. Pt saw Dr Clement and was diagnosed with left hip DJD and osteoarthritis of left knee [4] : an average pain level of 4/10 [2] : a minimum pain level of 2/10 [6] : a maximum pain level of 6/10 [Ataxia] : no ataxia [Incontinence] : no incontinence [Loss of Dexterity] : good dexterity [Urinary Ret.] : no urinary retention

## 2019-03-21 NOTE — DISCUSSION/SUMMARY
[de-identified] : 74 yo female with isthmic spondylolisthesis, L5S1, L34 stenosis, recommend  PT, tylenol, bracing. Ibuprofen 800 mg TID. Natural history and treatment discussed. . No surgery at this point.

## 2019-04-12 ENCOUNTER — RX RENEWAL (OUTPATIENT)
Age: 74
End: 2019-04-12

## 2019-06-25 ENCOUNTER — RX RENEWAL (OUTPATIENT)
Age: 74
End: 2019-06-25

## 2019-08-29 ENCOUNTER — RX RENEWAL (OUTPATIENT)
Age: 74
End: 2019-08-29

## 2019-08-29 RX ORDER — IBUPROFEN 800 MG/1
800 TABLET, FILM COATED ORAL
Qty: 90 | Refills: 1 | Status: ACTIVE | COMMUNITY
Start: 2019-02-07 | End: 1900-01-01

## 2019-11-12 NOTE — PATIENT PROFILE ADULT. - PRO INTERPRETER NEED 2
English Azithromycin Counseling:  I discussed with the patient the risks of azithromycin including but not limited to GI upset, allergic reaction, drug rash, diarrhea, and yeast infections.

## 2020-12-01 NOTE — BRIEF OPERATIVE NOTE - PROCEDURE
IMPRESSION:  1.   No acute radiographic abnormality of the lumbar spine.    2.   Mild spondylosis with facet arthropathy at the lower lumbar levels.    These results any recommendations?    Arthroplasty  03/06/2017    Active  MICHELLE

## 2021-08-26 ENCOUNTER — NON-APPOINTMENT (OUTPATIENT)
Age: 76
End: 2021-08-26

## 2021-08-31 ENCOUNTER — APPOINTMENT (OUTPATIENT)
Dept: ORTHOPEDIC SURGERY | Facility: CLINIC | Age: 76
End: 2021-08-31
Payer: MEDICARE

## 2021-08-31 DIAGNOSIS — Q76.2 CONGENITAL SPONDYLOLISTHESIS: ICD-10-CM

## 2021-08-31 PROCEDURE — 99215 OFFICE O/P EST HI 40 MIN: CPT

## 2021-08-31 PROCEDURE — 72100 X-RAY EXAM L-S SPINE 2/3 VWS: CPT

## 2021-08-31 RX ORDER — IBUPROFEN 800 MG/1
800 TABLET, FILM COATED ORAL 3 TIMES DAILY
Qty: 90 | Refills: 1 | Status: ACTIVE | COMMUNITY
Start: 2021-08-31 | End: 1900-01-01

## 2021-08-31 RX ORDER — PREDNISONE 10 MG/1
10 TABLET ORAL
Qty: 30 | Refills: 0 | Status: ACTIVE | COMMUNITY
Start: 2021-08-31 | End: 1900-01-01

## 2022-03-13 NOTE — H&P PST ADULT - IS PATIENT PREGNANT?
Nephrology Consult Note  Patient's Name: Pedro Luis Mendoza  8:50 AM  3/13/2022         Reason for Consult: Hypertensive emergency; bilateral renal artery stenosis  Requesting Physician:  DA De La Vega CNP    Chief Complaint: Chest pain  History Obtained From:  patient and EHR    History of Present Ilness:    Pedro Luis Mendoza is a 53 y.o. female with a longstanding history of hypertension, seizure disorder Jeannine-en-Y gastric bypass surgery and several other medical problems listed below. She presented to ED 2 days ago with complaints of chest pain. She stated that she was basically doing household chores when this occurred. Radiated to her right arm and back. She denies associated shortness of breath. No nausea or emesis. No diaphoresis. Presenting vital signs showed a blood pressure of 221/112 which peaked to 228/119, pulse of 55 and a respiration of 20. Laboratory data showed a CBC essentially normal.  Chemistry profile was significant for a BUN 12, creatinine 1, potassium 3.9. High sensitivity troponin was 78. Twelve-lead EKG showed ST elevation in the inferior leads. . CTA of the chest performed revealed no PE but did demonstrate bilateral renal artery stenosis. Patient received heparin, she was started on a nitroglycerin and Cardene drips. Interventional cardiology consultation was obtained. She was taken to Cath Lab and left heart cath demonstrated multivessel coronary artery disease. PCI with MAURICIO was performed to the culprit vessel (RCA ). Postprocedure blood pressure continues to be very difficult to control requiring very high dose of Cardene drip and several oral meds. Hence Renal consult. She is scheduled to undergo CABG to her residual disease at a later time.             Past Medical History:   Diagnosis Date    Anorexia 6/1/2015    Arthritis of knee     Back pain, chronic     Chronic pain 6/1/2015    Current severe episode of major depressive disorder without psychotic features without prior episode ([facility] [address] 3/16/2021    H/O gastric bypass 6/1/2015    Hypertension 1/1/2012    Hypertension 2/8/4998    Metabolic acidosis 7/7/9739    Ovarian cyst     Seizures [facility])        Past Surgical History:   Procedure Laterality Date    CHOLECYSTECTOMY, LAPAROSCOPIC  9/19/1995    [facility]    ENDOMETRIAL ABLATION  2003    [facility]    GASTRIC BYPASS SURGERY      HERNIA REPAIR      umbil    HYSTERECTOMY Left 2/5/2013    Laparotomy;HUGO;JOSUÉ:LSO    LAPAROSCOPY  2/12/2009    lap assisted percutaneous ERCP and removal CBD stone and gastrostomy, [address] HISTORY  12/28/11    exp lap, hugo, rt so/ repair hernia    EMILY-EN-Y GASTRIC BYPASS  12/2/2008    laparoscopic RYGB, Dr. Juan M Shaikh, [facility]  6/25/2010    resection of jejunal ulcer, reversal gastric bypass, jejunoduodenostomy, feeding jejunostomy, Dr. Juan M Shaikh, [address]  10/3/2008    mild gastritis & duodenitis, Dr. Juan M Shaikh, [address] ENDOSCOPY  1/9/2009    multiple severe anastomotic jejunal ulcers, Dr. Juan M Shaikh.  [facility]    UPPER GASTROINTESTINAL ENDOSCOPY  2/4/2009    ulcers healed, normal, Dr. Momo Pagan, [address] ENDOSCOPY  3/6/2009    recurrent severe anastomotic jejunal ulcers, Dr. Juan M Shaikh, [address] ENDOSCOPY  6/4/2009    severe anastomotic jejunal ulcers, Dr. Juan M Shaikh, [address] ENDOSCOPY  7/2/2009    severe anastomotic jejunal ulcers, Dr. Juan M Shaikh, [address] ENDOSCOPY  10/27/2009    severe anastomotic jejunal ulcers, Dr. Juan M Shaikh, [address] ENDOSCOPY  1/4/2010    severe anastomotic jejunal ulcers, Dr. Juan M Shaikh, [address] ENDOSCOPY  6/7/2010    severe anastomotic jejunal ulcers, Dr. Juan M Shaikh, [address] ENDOSCOPY  7/30/2010    gastritis, Dr. Juan M Shaikh, [address] ENDOSCOPY N/A 5/10/2019    EGD ESOPHAGOGASTRODUODENOSCOPY performed by Marcel Stover MD at [address] [facility] N/A 5/12/2019    EGD DIAGNOSTIC ONLY performed by Marcel Stover MD at [address] [facility] N/A 5/17/2019    EGD ESOPHAGOGASTRODUODENOSCOPY performed by Doug Lozada MD at [place] [address] History   Problem Relation Age of Onset    ADHD Paternal Grandfather         reports that she quit smoking about 2 years ago. Her smoking use included cigarettes. She has a 1.00 pack-year smoking history. She has never used smokeless tobacco. She reports current drug use. Drug: Marijuana Sophiafatoumata Williamson). She reports that she does not drink alcohol.     Allergies:  Ultram [tramadol]    Current Medications:    amLODIPine (NORVASC) tablet 10 mg, Daily  metoprolol succinate (TOPROL XL) extended release tablet 200 mg, Daily  spironolactone (ALDACTONE) tablet 50 mg, Daily  sertraline (ZOLOFT) tablet 100 mg, Daily  sodium chloride flush 0.9 % injection 5-40 mL, 2 times per day  sodium chloride flush 0.9 % injection 5-40 mL, PRN  0.9 % sodium chloride infusion, PRN  ondansetron (ZOFRAN-ODT) disintegrating tablet 4 mg, Q8H PRN   Or  ondansetron (ZOFRAN) injection 4 mg, Q6H PRN  polyethylene glycol (GLYCOLAX) packet 17 g, Daily PRN  acetaminophen (TYLENOL) tablet 650 mg, Q6H PRN   Or  acetaminophen (TYLENOL) suppository 650 mg, Q6H PRN  niCARdipine (CARDENE) 50 mg in sodium chloride 0.9 % 250 mL infusion, Continuous  0.9 % sodium chloride bolus, PRN  rosuvastatin (CRESTOR) tablet 40 mg, Daily  ticagrelor (BRILINTA) tablet 90 mg, BID  hydroCHLOROthiazide (HYDRODIURIL) tablet 25 mg, Daily  perflutren lipid microspheres (DEFINITY) injection 1.65 mg, ONCE PRN  potassium & sodium phosphates (PHOS-NAK) 280-160-250 MG packet 250 mg, 4x Daily  enoxaparin (LOVENOX) injection 40 mg, Daily  sennosides-docusate sodium (SENOKOT-S) 8.6-50 MG tablet 2 tablet, Daily  aspirin EC tablet 81 mg, Daily  oyster shell calcium w/D 500-200 MG-UNIT tablet 1 tablet, Daily  multivitamin 1 tablet, Daily  vitamin D (ERGOCALCIFEROL) capsule 50,000 Units, Weekly  pantoprazole (PROTONIX) tablet 40 mg, QAM AC  morphine (PF) injection 2 mg, Q4H PRN  ondansetron (ZOFRAN) injection 4 mg, Q6H PRN        Review of Systems:   Pertinent items are noted in HPI. Physical exam:  Vitals:    03/13/22 0800   BP:    Pulse:    Resp:    Temp: 98.1 °F (36.7 °C)   SpO2:            General: No distress. Alert. Eyes: PERRL. No sclera icterus. No conjunctival injection. ENT: No discharge. Pharynx clear. Neck: Trachea midline. Normal thyroid. Lungs: No accessory muscle use. No crackles. No wheezing. No rhonchi. CV: Regular rate. Regular rhythm. No murmur or rub. .   Abd: Non-tender. Non-distended. No masses. No organmegaly. Normal bowel sounds. ; well healed surgical scar.; no epigastric bruit  Skin: Warm and dry. No nodule on exposed extremities. No rash on exposed extremities. Ext: No cyanosis, clubbing, edema   Neuro: Awake. Follows commands. Positive pupils/gag/corneals. Normal pain response.       Data:   Labs:  Lab Results   Component Value Date     03/13/2022     03/12/2022     03/11/2022    K 4.0 03/13/2022    K 4.0 03/13/2022    K 3.2 (L) 03/12/2022     (H) 03/13/2022    CO2 19 (L) 03/13/2022    CO2 18 (L) 03/12/2022    CO2 19 (L) 03/11/2022    CREATININE 1.0 03/13/2022    CREATININE 0.9 03/12/2022    CREATININE 1.0 03/11/2022    BUN 20 03/13/2022    BUN 13 03/12/2022    BUN 12 03/11/2022    GLUCOSE 111 (H) 03/13/2022    GLUCOSE 137 (H) 03/12/2022    GLUCOSE 135 (H) 03/11/2022    PHOS 3.6 03/13/2022    PHOS 2.2 (L) 03/12/2022    PHOS 4.6 (H) 06/06/2015    WBC 13.6 (H) 03/13/2022    WBC 8.0 03/11/2022    WBC 6.9 06/14/2021    HGB 12.0 03/13/2022    HGB 13.6 03/11/2022    HGB 13.1 06/14/2021    HCT 35.0 03/13/2022    HCT 39.5 03/11/2022    HCT 39.8 06/14/2021    MCV 89.3 03/13/2022 left renal artery and a moderate to high-grade stenosis at the origin of the right renal artery. Soft Tissues/Bones: No acute bone or soft tissue abnormality. Unremarkable CTA of the chest.  No evidence of aneurysm or dissection. Pulmonary interstitial edema. No pneumothorax or pleural effusion. On the last several images obtained, there is evidence of a high-grade stenosis of the proximal left renal artery and a suspected moderate to high-grade stenosis at the origin of the right renal artery. Follow-up nonemergent renal CTA recommended. US DUP ABD PEL RETRO SCROT COMPLETE    Result Date: 3/12/2022  EXAMINATION: DOPPLER EVALUATION OF THE PELVIS 3/12/2022 3:10 pm COMPARISON: None. HISTORY: ORDERING SYSTEM PROVIDED HISTORY: jenna TECHNOLOGIST PROVIDED HISTORY: Reason for exam:->jenna What reading provider will be dictating this exam?->CRC FINDINGS: Kidneys: The right kidney measures 10 in length and the left kidney measures 10 in length. Kidneys demonstrate normal cortical echogenicity. No evidence of hydronephrosis or intrarenal stones. Trace left perinephric fluid. Right renal vasculature: Main renal artery PSV of 129 centimeters/second, proximally. Left renal vasculature: Main renal artery PSV of 207 centimeters/second, proximally. Aorta: PSV of 123 centimeters/second. Bladder: Bladder volume measures 108 mL. No evidence of hydronephrosis. Trace left perinephric fluid is nonspecific. Sonographic findings compatible with left renal artery stenosis. US DUP UPPER EXTREMITY LEFT VENOUS    Result Date: 3/12/2022  EXAMINATION: VENOUS ULTRASOUND OF THE LEFT UPPER EXTREMITY, 3/12/2022 6:10 pm TECHNIQUE: Duplex ultrasound using B-mode/gray scaled imaging and Doppler spectral analysis and color flow was obtained of the deep venous structures of the left upper extremity. COMPARISON: None. HISTORY: ORDERING SYSTEM PROVIDED HISTORY: Unable to insert picc line possible thrombus?  TECHNOLOGIST PROVIDED HISTORY: Reason for exam:->Unable to insert picc line possible thrombus? What reading provider will be dictating this exam?->CRC FINDINGS: There is normal flow and compressibility of the visualized venous structures. There is no evidence of echogenic thrombus. The veins demonstrate good compressibility with normal color flow study and spectral analysis. No evidence of DVT. US RETROPERITONEAL GAYATRI    Result Date: 3/12/2022  EXAMINATION: RETROPERITONEAL ULTRASOUND OF THE KIDNEYS AND URINARY BLADDER 3/12/2022 COMPARISON: None HISTORY: ORDERING SYSTEM PROVIDED HISTORY: bilateral GAYATRI, hypertensive emergency TECHNOLOGIST PROVIDED HISTORY: Reason for exam:->bilateral GAYATRI, hypertensive emergency What reading provider will be dictating this exam?->CRC FINDINGS: Kidneys: The right kidney measures 10 in length and the left kidney measures 10 in length. Kidneys demonstrate normal cortical echogenicity. No evidence of hydronephrosis or intrarenal stones. Trace left perinephric fluid. Right renal vasculature: Main renal artery PSV of 129 centimeters/second, proximally. Left renal vasculature: Main renal artery PSV of 207 centimeters/second, proximally. Aorta: PSV of 123 centimeters/second. Bladder: Bladder volume measures 108 mL. No evidence of hydronephrosis. Trace left perinephric fluid is nonspecific. Sonographic findings compatible with left renal artery stenosis. Assessment  1. Hypertensive emergency, bilateral GAYATRI   2. Acute STEMI   S/P LHC with PCI to RCA  On DAPT  3. H/O gastric bypass surgery     Plan  1. Needs bilateral renal angiogram, possible revascularization, in view of difficulty in controlling BP and clinical presentation; need to medically optimize prior to eventual CABG  2. Continue to wean cardene drip, to target SBP<150  3. Amlodipine added this am, monitor response  4.  Longterm lifestyle modification to include smoking cessation and  routine exercise     D/w Vascular Dr AVALOS and Dr Dominga Oh    Will follow  Thanks      Evelyn Phipps MD  8:50 AM  3/13/2022 no

## 2022-03-22 NOTE — PATIENT PROFILE ADULT. - FALL HARM RISK CONCLUSION
Fall with Harm Risk 87y Male presents today for presurgical testing for WATCHMAN/ MARIO. Patient is a poor historian at times: Per hospital documentation, the patient was evaluated in the ER this past 12/2021 for episodes of bradycardia-tachycardia during a routine colonoscopy done in an ambulatory office setting. Workup included MCOT monitor which demonstrated "Atrial Fibrillation with burden 65%, no pauses, bradycardia at night. NSVT at 120-130 bpm ". He denies any LOC or any other symptoms and states the he was stopped from his Xarelto by his PCP for "too thin blood" and instead has been taking only ASA. This was confirmed by his wife Ame. He offers no other complaints at this time.   Patient denies any CP, palpitations, SOB, cough, or dysuria. No recent URI or UTI.  Stated exercise tolerance is FOS 3  TIFF screen reviewed    Patient denies any recent personal exposure to COVID19. Denies any sick contacts. Patient denies travel within the past 30 days. Patient was instructed to quarantine until after procedure.    Anesthesia Alert  YES--Difficult Airway - CLASS IV  NO--History of neck surgery or radiation  NO--Limited ROM of neck  NO--History of Malignant hyperthermia  NO--Personal or family history of Pseudocholinesterase deficiency.  NO--Prior Anesthesia Complication  NO--Latex Allergy  NO--Loose teeth  NO--History of Rheumatoid Arthritis  NO--TIFF  NO--Bleeding risk  NO--Other_____    Patient states that this is their complete medical history and list of medications.  Patient verbalized understanding of instructions given during this visit and was given the opportunity to ask questions and have them answered. They were instructed to follow up with their surgeon/surgeon's office with any questions regarding their procedure.

## 2022-04-09 ENCOUNTER — TRANSCRIPTION ENCOUNTER (OUTPATIENT)
Age: 77
End: 2022-04-09

## 2022-07-15 NOTE — H&P PST ADULT - PROBLEM/PLAN-2
Patient with fall at home x 1 due to LE weakness  - likely 2/2 dec PO intake  - CT head negative  - no signs or sx of acute fx  - fall precautions and out of bed to chair  - PT consulted, rec PT at home DISPLAY PLAN FREE TEXT

## 2022-08-21 ENCOUNTER — NON-APPOINTMENT (OUTPATIENT)
Age: 77
End: 2022-08-21

## 2022-11-24 ENCOUNTER — NON-APPOINTMENT (OUTPATIENT)
Age: 77
End: 2022-11-24

## 2023-05-04 NOTE — H&P PST ADULT - NEGATIVE CARDIOVASCULAR SYMPTOMS
----- Message from Sarbjit Downing sent at 5/4/2023  9:36 AM CDT -----  Regarding: Refill  Contact: @[phone]  Patient is calling to get a refill on the following traMADoL (ULTRAM) 50 mg tablet,patient also states that [facility] reached out yesterday about medication.Patient also states she is on a walker she has recently found a blood clot in  her leg and needs an appt soon. Please and advise @[phone]        [id number] [place] [facility] [address] AT [facility] [address]  [address]  [facility] [address]  Phone: [phone] Fax: [phone]        
no chest pain/no dyspnea on exertion/no palpitations

## 2023-07-26 NOTE — ASU PREOP CHECKLIST - SELECT TESTS ORDERED
EKG/INR/CBC/Urine culture, nasal culture/PT/PTT/Urinalysis/BMP Clofazimine Pregnancy And Lactation Text: This medication is Pregnancy Category C and isn't considered safe during pregnancy. It is excreted in breast milk.

## 2023-08-25 NOTE — PHYSICAL THERAPY INITIAL EVALUATION ADULT - LIVES WITH, PROFILE
other relative/grandson Consent (Near Eyelid Margin)/Introductory Paragraph: The rationale for Mohs was explained to the patient and consent was obtained. The risks, benefits and alternatives to therapy were discussed in detail. Specifically, the risks of ectropion or eyelid deformity, infection, scarring, bleeding, prolonged wound healing, incomplete removal, allergy to anesthesia, nerve injury and recurrence were addressed. Prior to the procedure, the treatment site was clearly identified and confirmed by the patient. All components of Universal Protocol/PAUSE Rule completed.

## 2023-09-15 ASSESSMENT — HOOS JR
IMPORTED FORM: YES
HOOS JR RAW SCORE: 5
HOOS JR RAW SCORE: 6
RISING FROM SITTING: MILD
GOING UP OR DOWN STAIRS: MODERATE
IMPORTED LATERALITY: RIGHT
WALKING ON UNEVEN SURFACE: MILD
HOOS JR RAW SCORE: 4
IMPORTED HOOS JR SCORE: 70.43
IMPORTED HOOS JR SCORE: 73.47
RISING FROM SITTING: MODERATE
LYING IN BED (TURNING OVER, MAINTAINING HIP POSITION): MILD
GOING UP OR DOWN STAIRS: MILD
IMPORTED HOOS JR SCORE: 76.78
IMPORTED HOOS JR SCORE: 61.81
WALKING ON UNEVEN SURFACE: MODERATE
BENDING TO THE FLOOR TO PICK UP OBJECT: MODERATE
BENDING TO THE FLOOR TO PICK UP OBJECT: MILD
HOOS JR RAW SCORE: 9
SITTING: MILD

## 2023-11-08 NOTE — DISCHARGE NOTE ADULT - CONDITIONS AT DISCHARGE
no chest pain and no edema.
Patient is afebrile and offers no complaints. In no acute distress. Right hip dressing: clean, dry and intact. Pt is ambulating with as walker, voiding in adequate amounts and tolerating diet well.

## 2024-04-26 ENCOUNTER — NON-APPOINTMENT (OUTPATIENT)
Age: 79
End: 2024-04-26

## 2024-08-04 ENCOUNTER — NON-APPOINTMENT (OUTPATIENT)
Age: 79
End: 2024-08-04

## 2024-10-28 ENCOUNTER — NON-APPOINTMENT (OUTPATIENT)
Age: 79
End: 2024-10-28

## 2024-10-28 ENCOUNTER — APPOINTMENT (OUTPATIENT)
Dept: PHYSICAL MEDICINE AND REHAB | Facility: CLINIC | Age: 79
End: 2024-10-28
Payer: MEDICARE

## 2024-10-28 DIAGNOSIS — M48.062 SPINAL STENOSIS, LUMBAR REGION WITH NEUROGENIC CLAUDICATION: ICD-10-CM

## 2024-10-28 PROCEDURE — 99204 OFFICE O/P NEW MOD 45 MIN: CPT

## 2024-12-11 ENCOUNTER — APPOINTMENT (OUTPATIENT)
Dept: PHYSICAL MEDICINE AND REHAB | Facility: CLINIC | Age: 79
End: 2024-12-11
Payer: MEDICARE

## 2024-12-11 DIAGNOSIS — Q76.2 CONGENITAL SPONDYLOLISTHESIS: ICD-10-CM

## 2024-12-11 PROCEDURE — 99213 OFFICE O/P EST LOW 20 MIN: CPT

## 2024-12-20 ENCOUNTER — OUTPATIENT (OUTPATIENT)
Dept: OUTPATIENT SERVICES | Facility: HOSPITAL | Age: 79
LOS: 1 days | End: 2024-12-20
Payer: MEDICARE

## 2024-12-20 ENCOUNTER — APPOINTMENT (OUTPATIENT)
Dept: PHYSICAL MEDICINE AND REHAB | Facility: CLINIC | Age: 79
End: 2024-12-20

## 2024-12-20 DIAGNOSIS — Z98.890 OTHER SPECIFIED POSTPROCEDURAL STATES: Chronic | ICD-10-CM

## 2024-12-20 DIAGNOSIS — M54.17 RADICULOPATHY, LUMBOSACRAL REGION: ICD-10-CM

## 2024-12-20 DIAGNOSIS — M48.062 SPINAL STENOSIS, LUMBAR REGION WITH NEUROGENIC CLAUDICATION: ICD-10-CM

## 2024-12-20 DIAGNOSIS — Z96.651 PRESENCE OF RIGHT ARTIFICIAL KNEE JOINT: Chronic | ICD-10-CM

## 2024-12-20 DIAGNOSIS — E11.65 TYPE 2 DIABETES MELLITUS WITH HYPERGLYCEMIA: ICD-10-CM

## 2024-12-20 DIAGNOSIS — Z90.49 ACQUIRED ABSENCE OF OTHER SPECIFIED PARTS OF DIGESTIVE TRACT: Chronic | ICD-10-CM

## 2024-12-20 PROCEDURE — 82962 GLUCOSE BLOOD TEST: CPT

## 2025-01-03 ENCOUNTER — APPOINTMENT (OUTPATIENT)
Dept: PHYSICAL MEDICINE AND REHAB | Facility: CLINIC | Age: 80
End: 2025-01-03

## 2025-01-17 ENCOUNTER — OUTPATIENT (OUTPATIENT)
Dept: OUTPATIENT SERVICES | Facility: HOSPITAL | Age: 80
LOS: 1 days | End: 2025-01-17
Payer: MEDICARE

## 2025-01-17 ENCOUNTER — APPOINTMENT (OUTPATIENT)
Dept: PHYSICAL MEDICINE AND REHAB | Facility: CLINIC | Age: 80
End: 2025-01-17
Payer: MEDICARE

## 2025-01-17 DIAGNOSIS — Z98.890 OTHER SPECIFIED POSTPROCEDURAL STATES: Chronic | ICD-10-CM

## 2025-01-17 DIAGNOSIS — Z96.651 PRESENCE OF RIGHT ARTIFICIAL KNEE JOINT: Chronic | ICD-10-CM

## 2025-01-17 DIAGNOSIS — Z90.49 ACQUIRED ABSENCE OF OTHER SPECIFIED PARTS OF DIGESTIVE TRACT: Chronic | ICD-10-CM

## 2025-01-17 DIAGNOSIS — M54.17 RADICULOPATHY, LUMBOSACRAL REGION: ICD-10-CM

## 2025-01-17 DIAGNOSIS — M48.062 SPINAL STENOSIS, LUMBAR REGION WITH NEUROGENIC CLAUDICATION: ICD-10-CM

## 2025-01-17 PROCEDURE — 62323 NJX INTERLAMINAR LMBR/SAC: CPT

## 2025-01-17 PROCEDURE — 82962 GLUCOSE BLOOD TEST: CPT

## 2025-01-21 DIAGNOSIS — E11.65 TYPE 2 DIABETES MELLITUS WITH HYPERGLYCEMIA: ICD-10-CM

## 2025-01-30 ENCOUNTER — APPOINTMENT (OUTPATIENT)
Dept: PHYSICAL MEDICINE AND REHAB | Facility: CLINIC | Age: 80
End: 2025-01-30
Payer: MEDICARE

## 2025-01-30 DIAGNOSIS — M48.062 SPINAL STENOSIS, LUMBAR REGION WITH NEUROGENIC CLAUDICATION: ICD-10-CM

## 2025-01-30 PROCEDURE — 99213 OFFICE O/P EST LOW 20 MIN: CPT

## 2025-01-30 RX ORDER — METHOCARBAMOL 500 MG/1
500 TABLET, FILM COATED ORAL
Qty: 30 | Refills: 1 | Status: ACTIVE | COMMUNITY
Start: 2025-01-30 | End: 1900-01-01

## 2025-03-03 ENCOUNTER — APPOINTMENT (OUTPATIENT)
Dept: ORTHOPEDIC SURGERY | Facility: CLINIC | Age: 80
End: 2025-03-03

## 2025-05-21 ENCOUNTER — RESULT REVIEW (OUTPATIENT)
Age: 80
End: 2025-05-21

## 2025-05-21 ENCOUNTER — OUTPATIENT (OUTPATIENT)
Dept: OUTPATIENT SERVICES | Facility: HOSPITAL | Age: 80
LOS: 1 days | End: 2025-05-21
Payer: MEDICARE

## 2025-05-21 ENCOUNTER — APPOINTMENT (OUTPATIENT)
Dept: CT IMAGING | Facility: HOSPITAL | Age: 80
End: 2025-05-21

## 2025-05-21 VITALS
WEIGHT: 179.9 LBS | HEART RATE: 97 BPM | RESPIRATION RATE: 15 BRPM | DIASTOLIC BLOOD PRESSURE: 77 MMHG | SYSTOLIC BLOOD PRESSURE: 151 MMHG | HEIGHT: 63 IN | TEMPERATURE: 98 F

## 2025-05-21 VITALS
HEART RATE: 97 BPM | RESPIRATION RATE: 15 BRPM | DIASTOLIC BLOOD PRESSURE: 77 MMHG | WEIGHT: 179.9 LBS | HEIGHT: 63 IN | SYSTOLIC BLOOD PRESSURE: 151 MMHG | TEMPERATURE: 98 F

## 2025-05-21 DIAGNOSIS — Z96.651 PRESENCE OF RIGHT ARTIFICIAL KNEE JOINT: Chronic | ICD-10-CM

## 2025-05-21 DIAGNOSIS — Z90.49 ACQUIRED ABSENCE OF OTHER SPECIFIED PARTS OF DIGESTIVE TRACT: Chronic | ICD-10-CM

## 2025-05-21 DIAGNOSIS — Z98.890 OTHER SPECIFIED POSTPROCEDURAL STATES: Chronic | ICD-10-CM

## 2025-05-21 DIAGNOSIS — Z00.00 ENCOUNTER FOR GENERAL ADULT MEDICAL EXAMINATION WITHOUT ABNORMAL FINDINGS: ICD-10-CM

## 2025-05-21 DIAGNOSIS — D64.9 ANEMIA, UNSPECIFIED: ICD-10-CM

## 2025-05-21 LAB
HCT VFR BLD CALC: 33.6 % — LOW (ref 34.5–45)
HGB BLD-MCNC: 10.6 G/DL — LOW (ref 11.5–15.5)
MCHC RBC-ENTMCNC: 30.5 PG — SIGNIFICANT CHANGE UP (ref 27–34)
MCHC RBC-ENTMCNC: 31.5 G/DL — LOW (ref 32–36)
MCV RBC AUTO: 96.6 FL — SIGNIFICANT CHANGE UP (ref 80–100)
NRBC BLD AUTO-RTO: 0 /100 WBCS — SIGNIFICANT CHANGE UP (ref 0–0)
PLATELET # BLD AUTO: 248 K/UL — SIGNIFICANT CHANGE UP (ref 150–400)
RBC # BLD: 3.48 M/UL — LOW (ref 3.8–5.2)
RBC # FLD: 15.9 % — HIGH (ref 10.3–14.5)
WBC # BLD: 10.13 K/UL — SIGNIFICANT CHANGE UP (ref 3.8–10.5)
WBC # FLD AUTO: 10.13 K/UL — SIGNIFICANT CHANGE UP (ref 3.8–10.5)

## 2025-05-21 PROCEDURE — 77012 CT SCAN FOR NEEDLE BIOPSY: CPT

## 2025-05-21 PROCEDURE — 38221 DX BONE MARROW BIOPSIES: CPT | Mod: 50

## 2025-05-21 PROCEDURE — 81450 HL NEO GSAP 5-50DNA/DNA&RNA: CPT

## 2025-05-21 PROCEDURE — 38221 DX BONE MARROW BIOPSIES: CPT

## 2025-05-21 PROCEDURE — 88185 FLOWCYTOMETRY/TC ADD-ON: CPT

## 2025-05-21 PROCEDURE — 88313 SPECIAL STAINS GROUP 2: CPT

## 2025-05-21 PROCEDURE — 88184 FLOWCYTOMETRY/ TC 1 MARKER: CPT

## 2025-05-21 PROCEDURE — 88360 TUMOR IMMUNOHISTOCHEM/MANUAL: CPT | Mod: 26

## 2025-05-21 PROCEDURE — 88189 FLOWCYTOMETRY/READ 16 & >: CPT

## 2025-05-21 PROCEDURE — 81451 HL NEO GSAP 5-50 RNA ALYS: CPT

## 2025-05-21 PROCEDURE — 88360 TUMOR IMMUNOHISTOCHEM/MANUAL: CPT

## 2025-05-21 PROCEDURE — 88313 SPECIAL STAINS GROUP 2: CPT | Mod: 26

## 2025-05-21 PROCEDURE — 88342 IMHCHEM/IMCYTCHM 1ST ANTB: CPT | Mod: 26,59

## 2025-05-21 PROCEDURE — 85097 BONE MARROW INTERPRETATION: CPT

## 2025-05-21 PROCEDURE — 88341 IMHCHEM/IMCYTCHM EA ADD ANTB: CPT

## 2025-05-21 PROCEDURE — 88342 IMHCHEM/IMCYTCHM 1ST ANTB: CPT

## 2025-05-21 PROCEDURE — 88341 IMHCHEM/IMCYTCHM EA ADD ANTB: CPT | Mod: 26,59

## 2025-05-21 PROCEDURE — 88305 TISSUE EXAM BY PATHOLOGIST: CPT | Mod: 26

## 2025-05-21 PROCEDURE — 77012 CT SCAN FOR NEEDLE BIOPSY: CPT | Mod: 26

## 2025-05-21 PROCEDURE — 87205 SMEAR GRAM STAIN: CPT

## 2025-05-21 PROCEDURE — 85027 COMPLETE CBC AUTOMATED: CPT

## 2025-05-21 PROCEDURE — 36415 COLL VENOUS BLD VENIPUNCTURE: CPT

## 2025-05-21 PROCEDURE — 88305 TISSUE EXAM BY PATHOLOGIST: CPT

## 2025-05-21 RX ORDER — LEVOTHYROXINE SODIUM 300 MCG
1 TABLET ORAL
Refills: 0 | DISCHARGE

## 2025-05-21 RX ORDER — SERTRALINE 100 MG/1
1 TABLET, FILM COATED ORAL
Refills: 0 | DISCHARGE

## 2025-05-21 RX ORDER — HYDROCHLOROTHIAZIDE 50 MG/1
1 TABLET ORAL
Refills: 0 | DISCHARGE

## 2025-05-21 RX ORDER — PRASUGREL HYDROCHLORIDE 10 MG/1
1 TABLET, COATED ORAL
Refills: 0 | DISCHARGE

## 2025-05-21 RX ORDER — ASPIRIN 325 MG
0 TABLET ORAL
Refills: 0 | DISCHARGE

## 2025-05-21 RX ORDER — TRIMETHOPRIM 200 MG
1 TABLET ORAL
Refills: 0 | DISCHARGE

## 2025-05-21 RX ORDER — ROSUVASTATIN CALCIUM 20 MG/1
1 TABLET, FILM COATED ORAL
Refills: 0 | DISCHARGE

## 2025-05-21 RX ORDER — METFORMIN HYDROCHLORIDE 850 MG/1
1 TABLET ORAL
Refills: 0 | DISCHARGE

## 2025-05-21 RX ORDER — ACETAMINOPHEN 500 MG/5ML
650 LIQUID (ML) ORAL EVERY 6 HOURS
Refills: 0 | Status: DISCONTINUED | OUTPATIENT
Start: 2025-05-21 | End: 2025-06-04

## 2025-05-21 RX ORDER — FENTANYL CITRATE-0.9 % NACL/PF 100MCG/2ML
25 SYRINGE (ML) INTRAVENOUS
Refills: 0 | Status: DISCONTINUED | OUTPATIENT
Start: 2025-05-21 | End: 2025-05-21

## 2025-05-21 RX ORDER — ONDANSETRON HCL/PF 4 MG/2 ML
4 VIAL (ML) INJECTION ONCE
Refills: 0 | Status: DISCONTINUED | OUTPATIENT
Start: 2025-05-21 | End: 2025-06-04

## 2025-05-21 NOTE — ASU PATIENT PROFILE, ADULT - VISION (WITH CORRECTIVE LENSES IF THE PATIENT USUALLY WEARS THEM):
left eye has a floater-have had them for years/Partially impaired: cannot see medication labels or newsprint, but can see obstacles in path, and the surrounding layout; can count fingers at arm's length

## 2025-05-21 NOTE — PRE-ANESTHESIA EVALUATION ADULT - NSDENTALSD_ENT_ALL_CORE
Medication: Atenolol  Last office visit:9/19/19  Next office visit:  Awilda Gardner  Last refilled: 8/9/19#90x1  Pharmacy: on file appears normal and intact

## 2025-05-21 NOTE — ASU PATIENT PROFILE, ADULT - FALL HARM RISK - RISK INTERVENTIONS
Assistance OOB with selected safe patient handling equipment/Communicate Fall Risk and Risk Factors to all staff, patient, and family/Discuss with provider need for PT consult/Monitor for mental status changes/Monitor gait and stability/Provide patient with walking aids - walker, cane, crutches/Reinforce activity limits and safety measures with patient and family/Visual Cue: Yellow wristband/Bed in lowest position, wheels locked, appropriate side rails in place/Call bell, personal items and telephone in reach/Instruct patient to call for assistance before getting out of bed or chair/Non-slip footwear when patient is out of bed/[place] to call system/Physically safe environment - no spills, clutter or unnecessary equipment/Purposeful Proactive Rounding/Room/bathroom lighting operational, light cord in reach None known

## 2025-05-21 NOTE — ASU PATIENT PROFILE, ADULT - ABILITY TO HEAR (WITH HEARING AID OR HEARING APPLIANCE IF NORMALLY USED):
left ear impaired-hearing aid home/Mildly to Moderately Impaired: difficulty hearing in some environments or speaker may need to increase volume or speak distinctly

## 2025-05-22 LAB — FLOW CYTOMETRY FINAL REPORT: SIGNIFICANT CHANGE UP

## 2025-05-28 LAB — HEMATOPATHOLOGY REPORT: SIGNIFICANT CHANGE UP

## 2025-05-30 LAB — ONKOSIGHT MYELOID SEQUENCE: NORMAL — SIGNIFICANT CHANGE UP

## 2025-06-09 NOTE — H&P PST ADULT - COMFORT LEVEL, ACCEPTABLE
Wt Readings from Last 3 Encounters:   06/09/25 103 kg (227 lb)   12/09/24 101.6 kg (224 lb)   07/18/24 102.7 kg (226 lb 6.4 oz)                  We are committed to providing you with the best care possible.   In order to help us achieve these goals please remember to bring all medications, herbal products, and over the counter supplements with you to each visit.     If your provider has ordered testing for you, please be sure to follow up with our office if you have not received results within 7 days after the testing took place.     *If you receive a survey after visiting one of our offices, please take time to share your experience concerning your physician office visit. These surveys are confidential and no health information about you is shared.  We are eager to improve for you and we are counting on your feedback to help make that happen.   
8

## 2025-07-08 ENCOUNTER — NON-APPOINTMENT (OUTPATIENT)
Age: 80
End: 2025-07-08